# Patient Record
Sex: MALE | Race: WHITE | NOT HISPANIC OR LATINO | Employment: OTHER | URBAN - METROPOLITAN AREA
[De-identification: names, ages, dates, MRNs, and addresses within clinical notes are randomized per-mention and may not be internally consistent; named-entity substitution may affect disease eponyms.]

---

## 2017-01-05 ENCOUNTER — OFFICE VISIT (OUTPATIENT)
Dept: FAMILY MEDICINE | Facility: HOSPITAL | Age: 77
End: 2017-01-05
Attending: FAMILY MEDICINE
Payer: MEDICARE

## 2017-01-05 VITALS
DIASTOLIC BLOOD PRESSURE: 70 MMHG | BODY MASS INDEX: 18.18 KG/M2 | HEART RATE: 55 BPM | SYSTOLIC BLOOD PRESSURE: 118 MMHG | HEIGHT: 65 IN | WEIGHT: 109.13 LBS

## 2017-01-05 DIAGNOSIS — G30.9 ALZHEIMER'S DEMENTIA WITHOUT BEHAVIORAL DISTURBANCE, UNSPECIFIED TIMING OF DEMENTIA ONSET: Primary | ICD-10-CM

## 2017-01-05 DIAGNOSIS — F02.80 ALZHEIMER'S DEMENTIA WITHOUT BEHAVIORAL DISTURBANCE, UNSPECIFIED TIMING OF DEMENTIA ONSET: Primary | ICD-10-CM

## 2017-01-05 PROCEDURE — 99213 OFFICE O/P EST LOW 20 MIN: CPT | Performed by: FAMILY MEDICINE

## 2017-01-05 RX ORDER — MEMANTINE HYDROCHLORIDE 5 MG/1
5 TABLET ORAL 2 TIMES DAILY
Qty: 60 TABLET | Refills: 11 | Status: SHIPPED | OUTPATIENT
Start: 2017-01-05 | End: 2017-02-08 | Stop reason: DRUGHIGH

## 2017-01-05 NOTE — PROGRESS NOTES
Subjective:       Patient ID: Héctor Harvey is a 76 y.o. male.    Chief Complaint: Check-up    HPI Mr. Harvey is 77 yo M w/ PMHX of dementia who presents with for f/u of his Dementia, medication refills and labs. He is compliant with his Donepezil 10 mg and Namenda XR 28 mg. Patient lives with his wife and drives short distances when she is present. Can dress, bath and feed himself however he does not cook, pay bills, grocery shop alone or adminster his daily medications. Per family and patient, he sleeps well at night and denies confusion, weakness and mood changes. Mr. Anaya said he is feeling well overall and has no complaints.      Review of Systems   Constitutional: Negative for chills and fever.   Eyes: Negative for visual disturbance.   Respiratory: Negative for cough, shortness of breath and wheezing.    Cardiovascular: Negative for chest pain.   Gastrointestinal: Negative for abdominal pain, diarrhea and nausea.   Endocrine: Negative for polydipsia and polyuria.   Musculoskeletal: Negative for arthralgias.   Skin: Negative for rash.   Neurological: Negative for dizziness and headaches.   Psychiatric/Behavioral: Negative for sleep disturbance. The patient is not nervous/anxious.        Objective:      Vitals:    01/05/17 0922   BP: 118/70   Pulse: (!) 55     Physical Exam   Constitutional: He appears well-developed and well-nourished.   HENT:   Head: Normocephalic and atraumatic.   Eyes: EOM are normal.   Neck: Normal range of motion. Neck supple.   Cardiovascular: Normal rate, regular rhythm and normal heart sounds.    Pulmonary/Chest: Effort normal and breath sounds normal.   Abdominal: Soft. Bowel sounds are normal.   Musculoskeletal: Normal range of motion.   Neurological: He is alert. He exhibits normal muscle tone. Coordination normal.   Could no recall current year and name of hospital   Skin: Skin is warm and dry.   Psychiatric: His behavior is normal. Judgment and thought content normal.        Assessment:       1. Alzheimer's dementia without behavioral disturbance, unspecified timing of dementia onset        Plan:       Alzheimer's dementia without behavioral disturbance, unspecified timing of dementia onset  -     memantine (NAMENDA) 5 MG Tab; Take 1 tablet (5 mg total) by mouth 2 (two) times daily.  Dispense: 60 tablet; Refill: 11        -     Changed from Namenda to generic Memantine for insurance purposes        -     [facility] labs ordered, CMP, TSH w/ reflex free T4 and Lipid Panel    Return in about 3 months (around 4/5/2017).

## 2017-01-05 NOTE — PROGRESS NOTES
I assume primary medical responsibility for this patient, I have reviewed the case history, findings, diagnosis and treatment plan with the resident and agree that the care is reasonable and necessary. This service has been performed by a resident without the presence of a teaching physician under the primary care exception  Linda Major  1/5/2017

## 2017-02-08 ENCOUNTER — TELEPHONE (OUTPATIENT)
Dept: FAMILY MEDICINE | Facility: HOSPITAL | Age: 77
End: 2017-02-08

## 2017-02-08 DIAGNOSIS — G30.9 ALZHEIMER'S DEMENTIA WITHOUT BEHAVIORAL DISTURBANCE, UNSPECIFIED TIMING OF DEMENTIA ONSET: Primary | ICD-10-CM

## 2017-02-08 DIAGNOSIS — F02.80 ALZHEIMER'S DEMENTIA WITHOUT BEHAVIORAL DISTURBANCE, UNSPECIFIED TIMING OF DEMENTIA ONSET: Primary | ICD-10-CM

## 2017-02-08 RX ORDER — MEMANTINE HYDROCHLORIDE 10 MG/1
10 TABLET ORAL 2 TIMES DAILY
Qty: 60 TABLET | Refills: 6 | Status: SHIPPED | OUTPATIENT
Start: 2017-02-08 | End: 2017-07-26 | Stop reason: SDUPTHER

## 2017-02-08 NOTE — TELEPHONE ENCOUNTER
Called patient's daughter Ms. Quick. At last appt 1/2017, switched from Namenda 28 mg to generic Memantine 5 mg BID due to change in insurance coverage. States father's behavior has changed since switching from Namenda. Placed order for Memantine 10 mg BID. Will monitor and if patient does not do well on increased dosage, will consider contacting insurance company to request coverage of previously prescribed Namenda.

## 2017-04-25 ENCOUNTER — OFFICE VISIT (OUTPATIENT)
Dept: FAMILY MEDICINE | Facility: HOSPITAL | Age: 77
End: 2017-04-25
Attending: FAMILY MEDICINE
Payer: MEDICARE

## 2017-04-25 VITALS
SYSTOLIC BLOOD PRESSURE: 122 MMHG | HEIGHT: 66 IN | HEART RATE: 59 BPM | BODY MASS INDEX: 16.87 KG/M2 | WEIGHT: 104.94 LBS | DIASTOLIC BLOOD PRESSURE: 73 MMHG

## 2017-04-25 DIAGNOSIS — G30.9 ALZHEIMER'S DEMENTIA WITHOUT BEHAVIORAL DISTURBANCE, UNSPECIFIED TIMING OF DEMENTIA ONSET: Primary | ICD-10-CM

## 2017-04-25 DIAGNOSIS — F02.80 ALZHEIMER'S DEMENTIA WITHOUT BEHAVIORAL DISTURBANCE, UNSPECIFIED TIMING OF DEMENTIA ONSET: Primary | ICD-10-CM

## 2017-04-25 DIAGNOSIS — R17 TOTAL BILIRUBIN, ELEVATED: ICD-10-CM

## 2017-04-25 PROCEDURE — 99213 OFFICE O/P EST LOW 20 MIN: CPT | Performed by: FAMILY MEDICINE

## 2017-04-25 NOTE — MR AVS SNAPSHOT
"    [facility]  [address], [address]  [place] [address]  Phone: [phone]  Fax: [phone]                  Héctor Harvey   2017 1:20 PM   Office Visit    Description:  Male : 1940   Provider:  Corinna Roy MD   Department:  [facility]           Reason for Visit     Check-up                To Do List           Goals (5 Years of Data)     None      [facility]     [facility] Care Line -  Assistance  Unless otherwise directed by your provider, please contact [facility], our nurse care line that is available for  assistance.     Registered nurses in the [facility] provide: appointment scheduling, clinical advisement, health education, and other advisory services.  Call: [phone] (toll free)               Medications           Message regarding Medications     Verify the changes and/or additions to your medication regime listed below are the same as discussed with your clinician today.  If any of these changes or additions are incorrect, please notify your healthcare provider.             Verify that the below list of medications is an accurate representation of the medications you are currently taking.  If none reported, the list may be blank. If incorrect, please contact your healthcare provider. Carry this list with you in case of emergency.           Current Medications     cholecalciferol, vitamin D3, 1,000 unit capsule Take 1 capsule (1,000 Units total) by mouth once daily.    donepezil (ARICEPT) 10 MG tablet Take 1 tablet (10 mg total) by mouth once daily.    memantine (NAMENDA) 10 MG Tab Take 1 tablet (10 mg total) by mouth 2 (two) times daily.           Clinical Reference Information           Your Vitals Were     BP Pulse Height Weight BMI    122/73 59 5' 6" (1.676 m) 47.6 kg (104 lb 15 oz) 16.94 kg/m2      Blood Pressure          Most Recent Value    BP  122/73      Allergies as of " 4/25/2017     No Known Allergies      Immunizations Administered on Date of Encounter - 4/25/2017     None      Language Assistance Services     ATTENTION: Language assistance services are available, free of charge. Please call [phone].      ATENCIÓN: Si steff ramirez, tiene a dooley disposición servicios gratuitos de asistencia lingüística. Llame al [phone].     CHÚ Ý: N?u b?n nói Ti?ng Vi?t, có các d?ch v? h? tr? ngôn ng? mi?n phí dành cho b?n. G?i s? [phone].         [facility] complies with applicable Federal civil rights laws and does not discriminate on the basis of race, color, national origin, age, disability, or sex.

## 2017-04-25 NOTE — PROGRESS NOTES
I reviewed the note and discussed with Dr. Roy. Stable dementia. It sounds like the bilirubinemia has been present for a year at least. Getting a total and direct bili at some point is reasonable.

## 2017-04-25 NOTE — PROGRESS NOTES
Subjective:       Patient ID: Héctor Harvey is a 77 y.o. male.    Chief Complaint: Check-up    HPI Mr. Harvey is 77 yo M w/ PMHX of dementia who presents for check-up and lab results. He is compliant with his Donepezil 10 mg and Memantine 10 mg with no complaints (was previously taking Namenda XR 28 mg). Patient lives with his wife and drives short distances when she is present. Per family, patient has no changes from baseline. States he is feeling well overall and has no complaints.      Review of Systems   Constitutional: Negative for chills and fever.   HENT: Negative for congestion, hearing loss and sore throat.    Eyes: Negative for visual disturbance.   Respiratory: Negative for cough, shortness of breath and wheezing.    Cardiovascular: Negative for chest pain and leg swelling.   Gastrointestinal: Negative for abdominal pain, diarrhea and nausea.   Endocrine: Negative for polydipsia and polyuria.   Musculoskeletal: Negative for arthralgias and gait problem.   Skin: Negative for rash.   Neurological: Negative for weakness and numbness.   Psychiatric/Behavioral: Negative for sleep disturbance. The patient is not nervous/anxious.        Objective:      Vitals:    04/25/17 1323   BP: 122/73   Pulse: (!) 59     Physical Exam   Constitutional: He appears well-developed and well-nourished.   HENT:   Head: Normocephalic and atraumatic.   Mouth/Throat: Oropharynx is clear and moist.   Bilateral excess cerumen   Eyes: EOM are normal. No scleral icterus.   Neck: Normal range of motion. Neck supple.   Cardiovascular: Normal rate, regular rhythm and normal heart sounds.    Pulmonary/Chest: Effort normal and breath sounds normal.   Abdominal: Soft. Bowel sounds are normal.   Musculoskeletal: Normal range of motion.   Neurological: He is alert. He exhibits normal muscle tone. Coordination normal.   Skin: Skin is warm and dry.   Psychiatric: His behavior is normal. Judgment and thought content normal.       Assessment:        1. Alzheimer's dementia without behavioral disturbance, unspecified timing of dementia onset    2. Total bilirubin, elevated        Plan:       Alzheimer's dementia without behavioral disturbance, unspecified timing of dementia onset  -Currently takes Donepezil 10 mg and Memantine 10 mg   -Wife and daughter deny changes from baseline    Total Bilirubin, Elevated  -Last T. Bili 2.2 (2/2017). Per chart check 2.5 (3/2016)  -Patient denies abdominal pain, N/V, changes in bowel habits  -Will continue to monitor      Return in about 3 months (around 7/25/2017).      Corinna Roy MD  4/25/2017  [facility]

## 2017-07-26 ENCOUNTER — OFFICE VISIT (OUTPATIENT)
Dept: FAMILY MEDICINE | Facility: HOSPITAL | Age: 77
End: 2017-07-26
Attending: FAMILY MEDICINE
Payer: MEDICARE

## 2017-07-26 VITALS
HEART RATE: 58 BPM | SYSTOLIC BLOOD PRESSURE: 108 MMHG | WEIGHT: 102.5 LBS | DIASTOLIC BLOOD PRESSURE: 69 MMHG | HEIGHT: 66 IN | BODY MASS INDEX: 16.47 KG/M2

## 2017-07-26 DIAGNOSIS — F02.80 ALZHEIMER'S DEMENTIA WITHOUT BEHAVIORAL DISTURBANCE, UNSPECIFIED TIMING OF DEMENTIA ONSET: Primary | ICD-10-CM

## 2017-07-26 DIAGNOSIS — G30.9 ALZHEIMER'S DEMENTIA WITHOUT BEHAVIORAL DISTURBANCE, UNSPECIFIED TIMING OF DEMENTIA ONSET: Primary | ICD-10-CM

## 2017-07-26 DIAGNOSIS — R17 TOTAL BILIRUBIN, ELEVATED: ICD-10-CM

## 2017-07-26 PROCEDURE — 99213 OFFICE O/P EST LOW 20 MIN: CPT | Performed by: FAMILY MEDICINE

## 2017-07-26 RX ORDER — MEMANTINE HYDROCHLORIDE 10 MG/1
10 TABLET ORAL 2 TIMES DAILY
Qty: 180 TABLET | Refills: 6 | Status: SHIPPED | OUTPATIENT
Start: 2017-07-26 | End: 2018-08-16 | Stop reason: SDUPTHER

## 2017-07-26 NOTE — PROGRESS NOTES
Subjective:       Patient ID: Héctor Harvey is a 77 y.o. male.    Chief Complaint: F/U and medication refill    HPI  Mr. Harvey is a 77 yo M w/ PMHX of Alzheimer's dementia who presents for check-up and medication refill. Currently takes Donepezil 10 mg and Memantine 10 mg with no complaints. Patient lives with his wife and drives short distances (i.e. [employer] and grocery store) when she is present. Per family, patient has had no recent changes from baseline. Patient states he is feeling well overall and has no complaints. Per chart check patient, patient does have elevated total bili at baseline. Most recent total bili 2.2 (2/2107), 2.5 (3/2016). Per chart check, previous Gilbert Syndrome diagnosis.    Review of Systems   Eyes: Negative for visual disturbance.   Respiratory: Negative for cough and shortness of breath.    Cardiovascular: Negative for chest pain and leg swelling.   Gastrointestinal: Negative for abdominal pain, nausea and vomiting.   Endocrine: Negative for polydipsia and polyuria.   Musculoskeletal: Negative for arthralgias and gait problem.   Skin: Negative for rash.   Neurological: Negative for dizziness, weakness and numbness.   Psychiatric/Behavioral: Negative for sleep disturbance. The patient is not nervous/anxious.        Objective:      Vitals:    07/26/17 0843   BP: 108/69   Pulse: (!) 58     Physical Exam   Constitutional: He appears well-developed and well-nourished.   HENT:   Head: Normocephalic and atraumatic.   Mouth/Throat: Oropharynx is clear and moist.   Eyes: No scleral icterus.   Cardiovascular: Normal rate, regular rhythm and normal heart sounds.    Pulmonary/Chest: Effort normal and breath sounds normal.   Abdominal: Soft. Bowel sounds are normal.   Musculoskeletal: Normal range of motion.   Neurological: He is alert. He exhibits normal muscle tone. Coordination normal.   Aware of person and situation   Skin: Skin is warm and dry.   Psychiatric: His behavior is normal. Judgment  and thought content normal.       Assessment:       1. Alzheimer's dementia without behavioral disturbance, unspecified timing of dementia onset    2. Total bilirubin, elevated        Plan:       Alzheimer's dementia without behavioral disturbance, unspecified timing of dementia onset  -Currently takes Donepezil 10 mg and Memantine 10 mg   -Doing well with current meds. Wife and daughter deny changes from baseline  -memantine (NAMENDA) 10 MG Tab; Take 1 tablet (10 mg total) by mouth 2 (two) times daily.    Total bilirubin, elevated  -Last T. Bili 2.2 (2/2017). Per chart check 2.5 (3/2016)  -Comprehensive metabolic panel; Future  -Per chart check, previous Gilbert's Syndrome diagnosis   -Will continue to monitor      Return in about 3 months (around 10/26/2017).      Corinna Roy MD  7/26/2017  [facility] HO-2

## 2017-07-27 ENCOUNTER — TELEPHONE (OUTPATIENT)
Dept: FAMILY MEDICINE | Facility: HOSPITAL | Age: 77
End: 2017-07-27

## 2017-08-02 RX ORDER — DONEPEZIL HYDROCHLORIDE 10 MG/1
10 TABLET, FILM COATED ORAL DAILY
Qty: 90 TABLET | Refills: 3 | Status: SHIPPED | OUTPATIENT
Start: 2017-08-02 | End: 2018-07-24 | Stop reason: SDUPTHER

## 2017-08-18 ENCOUNTER — TELEPHONE (OUTPATIENT)
Dept: HEPATOLOGY | Facility: HOSPITAL | Age: 77
End: 2017-08-18

## 2017-08-18 NOTE — TELEPHONE ENCOUNTER
Patient's daughter (Ynes Harvey) contacted clinic today regarding patient (her dad) having multiple recent falls. Returned daughter's call and advised her to take her dad to ED for evaluation if she was concerned about his mentation and physical condition.

## 2017-11-13 ENCOUNTER — OFFICE VISIT (OUTPATIENT)
Dept: FAMILY MEDICINE | Facility: HOSPITAL | Age: 77
End: 2017-11-13
Attending: FAMILY MEDICINE
Payer: MEDICARE

## 2017-11-13 VITALS
DIASTOLIC BLOOD PRESSURE: 75 MMHG | SYSTOLIC BLOOD PRESSURE: 113 MMHG | HEIGHT: 66 IN | HEART RATE: 59 BPM | BODY MASS INDEX: 17.29 KG/M2 | WEIGHT: 107.56 LBS

## 2017-11-13 DIAGNOSIS — Z23 NEED FOR PROPHYLACTIC VACCINATION AND INOCULATION AGAINST INFLUENZA: ICD-10-CM

## 2017-11-13 DIAGNOSIS — F02.80 ALZHEIMER'S DEMENTIA WITHOUT BEHAVIORAL DISTURBANCE, UNSPECIFIED TIMING OF DEMENTIA ONSET: ICD-10-CM

## 2017-11-13 DIAGNOSIS — G30.9 ALZHEIMER'S DEMENTIA WITHOUT BEHAVIORAL DISTURBANCE, UNSPECIFIED TIMING OF DEMENTIA ONSET: ICD-10-CM

## 2017-11-13 DIAGNOSIS — E80.4 GILBERT'S DISEASE: Primary | ICD-10-CM

## 2017-11-13 PROCEDURE — 99213 OFFICE O/P EST LOW 20 MIN: CPT | Mod: 25 | Performed by: FAMILY MEDICINE

## 2017-11-13 PROCEDURE — G0008 ADMIN INFLUENZA VIRUS VAC: HCPCS

## 2017-11-13 NOTE — PROGRESS NOTES
"Subjective:       Patient ID: Héctor Harvey is a 77 y.o. male.    Chief Complaint: Follow-up and Flu Vaccine    HPI Pt is 78 yo M w/ PMHX of Alzheimer's dementia who presents for check-up. He currently takes Donepezil 10 mg and Memantine 10 mg with no issues. Per patient's daughter, patient has had two recent falls in the last month while walking his dog outside. Per wife, he "tripped" on the sidewalk. There was no head trauma or LOC with either fall. Patient lives with his wife and previously drove short distances (i.e. [employer] and grocery store) when wife was present, however due to recent falls patient no longer drives. No other recent changes from baseline per patient and family. He reports good hydration and denies visual changes, headaches, SOB.     Review of Systems   Constitutional: Negative for activity change and appetite change.   Eyes: Negative for visual disturbance.   Respiratory: Negative for cough and shortness of breath.    Cardiovascular: Negative for chest pain.   Gastrointestinal: Negative for abdominal pain, nausea and vomiting.   Musculoskeletal: Negative for arthralgias.   Skin: Negative for rash.   Neurological: Negative for dizziness, weakness, numbness and headaches.   Psychiatric/Behavioral: Negative for sleep disturbance. The patient is not nervous/anxious.        Objective:      Vitals:    11/13/17 0834   BP: 113/75. 120/70 sitting, 116/70 standing   Pulse: (!) 59     Physical Exam   Constitutional: He appears well-developed and well-nourished.   HENT:   Head: Normocephalic and atraumatic.   Mouth/Throat: Oropharynx is clear and moist.   Eyes: No scleral icterus.   Cardiovascular: Normal rate, regular rhythm and normal heart sounds.    Pulmonary/Chest: Effort normal and breath sounds normal.   Abdominal: Soft. Bowel sounds are normal.   Musculoskeletal: Normal range of motion.   5/5 strength upper and lower extremities. ROM intact. Stable gait   Neurological: He is alert. He exhibits " normal muscle tone. Coordination normal.   Aware of person,  and situation   Skin: Skin is warm and dry.   Psychiatric: His behavior is normal. Judgment and thought content normal.       Assessment:       1. Gilbert's disease    2. Alzheimer's dementia without behavioral disturbance, unspecified timing of dementia onset    3. Need for prophylactic vaccination and inoculation against influenza        Plan:       Gilbert's disease       -    Most recent T. Bili 2.1 improved from 2.2 (2017)    Alzheimer's dementia without behavioral disturbance, unspecified timing of dementia onset  -     Ambulatory Referral to Physical/Occupational Therapy  -     Pt with recent falls. PT/OT will assess  -     Negative orthostatics in clinic    Need for prophylactic vaccination and inoculation against influenza  -     Flu Vaccine - High Dose (PF) (65+)      Return in about 3 months (around 2018).   Corinna Roy MD  2017  [facility]          Flu consent signed by patient. Flu vaccine administered.

## 2017-11-17 DIAGNOSIS — G30.9 ALZHEIMER'S DEMENTIA WITHOUT BEHAVIORAL DISTURBANCE, UNSPECIFIED TIMING OF DEMENTIA ONSET: ICD-10-CM

## 2017-11-17 DIAGNOSIS — F02.80 ALZHEIMER'S DEMENTIA WITHOUT BEHAVIORAL DISTURBANCE, UNSPECIFIED TIMING OF DEMENTIA ONSET: ICD-10-CM

## 2017-11-17 DIAGNOSIS — R26.89 LOSS OF BALANCE: Primary | ICD-10-CM

## 2018-03-02 ENCOUNTER — OFFICE VISIT (OUTPATIENT)
Dept: FAMILY MEDICINE | Facility: HOSPITAL | Age: 78
End: 2018-03-02
Attending: FAMILY MEDICINE
Payer: MEDICARE

## 2018-03-02 VITALS
BODY MASS INDEX: 17.12 KG/M2 | HEART RATE: 63 BPM | SYSTOLIC BLOOD PRESSURE: 100 MMHG | DIASTOLIC BLOOD PRESSURE: 64 MMHG | WEIGHT: 106.5 LBS | HEIGHT: 66 IN

## 2018-03-02 DIAGNOSIS — F02.80 OTHER FRONTOTEMPORAL DEMENTIA WITHOUT BEHAVIORAL DISTURBANCE: Primary | ICD-10-CM

## 2018-03-02 DIAGNOSIS — G30.8 ALZHEIMER'S DISEASE OF OTHER ONSET WITHOUT BEHAVIORAL DISTURBANCE: ICD-10-CM

## 2018-03-02 DIAGNOSIS — E80.4 GILBERT'S DISEASE: ICD-10-CM

## 2018-03-02 DIAGNOSIS — F02.80 ALZHEIMER'S DISEASE OF OTHER ONSET WITHOUT BEHAVIORAL DISTURBANCE: ICD-10-CM

## 2018-03-02 DIAGNOSIS — G31.09 OTHER FRONTOTEMPORAL DEMENTIA WITHOUT BEHAVIORAL DISTURBANCE: Primary | ICD-10-CM

## 2018-03-02 PROCEDURE — 99213 OFFICE O/P EST LOW 20 MIN: CPT | Performed by: FAMILY MEDICINE

## 2018-03-02 NOTE — PROGRESS NOTES
Subjective:       Patient ID: Héctor Harvey is a 78 y.o. male.    Chief Complaint: Follow-up    HPI Pt is 79 yo M w/ PMHX of Alzheimer's dementia, Gilbert's Disease who presents for follow-up. He currently takes Donepezil 10 mg and Memantine 10 mg with no issues. Patient lives with his wife and previously drove short distances (i.e. [employer] and grocery store) when wife was present, however patient's daughter states that they no longer allow him to drive due to decreased reaction time. No other recent changes from baseline per patient and family. Patient can feed, dress and bathe himself with no issues. He has had no recent falls.    Per patient's daughter, during initial Alzheimer's Disease diagnosis years ago, patient had complete dementia work-up to rule out other causes of dementia including CT head.    Review of Systems   Constitutional: Negative for activity change and appetite change.   Eyes: Negative for visual disturbance.   Respiratory: Negative for cough and shortness of breath.    Cardiovascular: Negative for chest pain.   Gastrointestinal: Negative for abdominal pain, nausea and vomiting.   Musculoskeletal: Negative for arthralgias.   Skin: Negative for rash.   Neurological: Negative for dizziness, weakness, numbness and headaches.   Psychiatric/Behavioral: Negative for sleep disturbance. The patient is not nervous/anxious.        Objective:      Vitals:    03/02/18 0853   BP: 100/64   Pulse: 63     Physical Exam   Constitutional: He appears well-developed and well-nourished.   Pleasant, cooperative   HENT:   Head: Normocephalic and atraumatic.   Mouth/Throat: Oropharynx is clear and moist.   Eyes: No scleral icterus.   Cardiovascular: Normal rate, regular rhythm and normal heart sounds.    Pulmonary/Chest: Effort normal and breath sounds normal.   Abdominal: Soft. Bowel sounds are normal.   Musculoskeletal: Normal range of motion.   5/5 strength upper and lower extremities. ROM intact. Stable gait    Neurological: He is alert. He exhibits normal muscle tone. Coordination normal.   Alert. Oriented to person and situation   Skin: Skin is warm and dry.   Psychiatric: His behavior is normal. Judgment and thought content normal.       Assessment:       1. Other frontotemporal dementia without behavioral disturbance    2. Alzheimer's disease of other onset without behavioral disturbance    3. Gilbert's disease        Plan:       Other frontotemporal dementia without behavioral disturbance  -     Magnesium; Future; Expected date: 03/02/2018  -     VITAMIN B-12 BINDING CAPACITY; Future; Expected date: 03/02/2018  -     TSH; Future; Expected date: 03/02/2018        -      Per patient's daughter, during initial Alzheimer's Disease diagnosis years ago, patient had complete dementia work-up to rule out other causes of dementia including CT head.    Alzheimer's disease of other onset without behavioral disturbance         -     Continue Donepezil and Namenda    Gilbert's disease  -     Comprehensive metabolic panel; Future; Expected date: 03/02/2018  -     Most recent T. Bili 2.1 7/2017      Follow-up in about 6 months (around 9/2/2018).     Corinna Roy MD  3/2/2018  [facility] HO-2

## 2018-07-24 ENCOUNTER — OFFICE VISIT (OUTPATIENT)
Dept: FAMILY MEDICINE | Facility: HOSPITAL | Age: 78
End: 2018-07-24
Attending: FAMILY MEDICINE
Payer: MEDICARE

## 2018-07-24 ENCOUNTER — TELEPHONE (OUTPATIENT)
Dept: FAMILY MEDICINE | Facility: HOSPITAL | Age: 78
End: 2018-07-24

## 2018-07-24 VITALS
HEIGHT: 65 IN | SYSTOLIC BLOOD PRESSURE: 108 MMHG | HEART RATE: 65 BPM | DIASTOLIC BLOOD PRESSURE: 73 MMHG | WEIGHT: 104.06 LBS | BODY MASS INDEX: 17.34 KG/M2

## 2018-07-24 DIAGNOSIS — G30.8 ALZHEIMER'S DISEASE OF OTHER ONSET WITHOUT BEHAVIORAL DISTURBANCE: Primary | ICD-10-CM

## 2018-07-24 DIAGNOSIS — E80.4 GILBERT'S DISEASE: ICD-10-CM

## 2018-07-24 DIAGNOSIS — F02.80 ALZHEIMER'S DISEASE OF OTHER ONSET WITHOUT BEHAVIORAL DISTURBANCE: Primary | ICD-10-CM

## 2018-07-24 PROCEDURE — 99213 OFFICE O/P EST LOW 20 MIN: CPT | Performed by: FAMILY MEDICINE

## 2018-07-24 RX ORDER — DONEPEZIL HYDROCHLORIDE 10 MG/1
10 TABLET, FILM COATED ORAL DAILY
Qty: 90 TABLET | Refills: 3 | Status: SHIPPED | OUTPATIENT
Start: 2018-07-24 | End: 2019-05-24 | Stop reason: SDUPTHER

## 2018-07-24 NOTE — PROGRESS NOTES
I assume primary medical responsibility for this patient, I have reviewed the case history, findings, diagnosis and treatment plan with the resident and agree that the care is reasonable and necessary. This service has been performed by a resident without the presence of a teaching physician under the primary care exception  Linda Major  7/24/2018

## 2018-07-24 NOTE — PROGRESS NOTES
Subjective:       Patient ID: Héctor Harvey is a 78 y.o. male.    Chief Complaint: Check-up    HPI Pt is 77 yo M w/ PMHx of Alzheimer's dementia, Gilbert's Disease who presents for follow-up. He currently takes Donepezil 10 mg and Memantine 10 mg with no issues. Lives with his wife and no longer drives. Relatives took car keys due to decreased reaction time. No other recent changes from baseline per patient's daughter. Patient not oriented to time and place. Has had no recent falls. Per patient's daughter, she stimulates him often with music and redirects him as needed.      Review of Systems   Constitutional: Negative for activity change and appetite change.   Eyes: Negative for visual disturbance.   Respiratory: Negative for cough and shortness of breath.    Cardiovascular: Negative for chest pain.   Gastrointestinal: Negative for abdominal pain, nausea and vomiting.   Musculoskeletal: Negative for arthralgias.   Skin: Negative for rash.   Neurological: Negative for dizziness, weakness, numbness and headaches.   Psychiatric/Behavioral: Negative for sleep disturbance. The patient is not nervous/anxious.        Objective:      Vitals:    07/24/18 0839   BP: 108/73   Pulse: 65     Physical Exam   Constitutional: He appears well-developed and well-nourished.   Pleasant, cooperative   HENT:   Head: Normocephalic and atraumatic.   Mouth/Throat: Oropharynx is clear and moist.   Eyes: No scleral icterus.   Cardiovascular: Normal rate, regular rhythm and normal heart sounds.    Pulmonary/Chest: Effort normal and breath sounds normal.   Abdominal: Soft. Bowel sounds are normal.   Musculoskeletal: Normal range of motion.   5/5 strength upper and lower extremities. ROM intact.    Neurological: He is alert. He exhibits normal muscle tone. Coordination normal.   Follows directions well   Skin: Skin is warm and dry.   Psychiatric: His behavior is normal. Judgment and thought content normal.       Assessment:       1.  Alzheimer's disease of other onset without behavioral disturbance    2. Gilbert's disease        Plan:       Alzheimer's disease of other onset without behavioral disturbance  -     donepezil (ARICEPT) 10 MG tablet; Take 1 tablet (10 mg total) by mouth once daily.  Dispense: 90 tablet; Refill: 3    Gilbert's disease       -    Most recent T. Bili 1.9. Continues to trend down. Will repeat CMP at next visit.      Follow-up in about 3 months (around 10/24/2018).     Corinna Roy MD  7/24/2018  [facility]

## 2018-08-16 DIAGNOSIS — G30.9 ALZHEIMER'S DEMENTIA WITHOUT BEHAVIORAL DISTURBANCE, UNSPECIFIED TIMING OF DEMENTIA ONSET: ICD-10-CM

## 2018-08-16 DIAGNOSIS — F02.80 ALZHEIMER'S DEMENTIA WITHOUT BEHAVIORAL DISTURBANCE, UNSPECIFIED TIMING OF DEMENTIA ONSET: ICD-10-CM

## 2018-08-16 RX ORDER — MEMANTINE HYDROCHLORIDE 10 MG/1
10 TABLET ORAL 2 TIMES DAILY
Qty: 180 TABLET | Refills: 6 | Status: SHIPPED | OUTPATIENT
Start: 2018-08-16 | End: 2019-11-11 | Stop reason: SDUPTHER

## 2018-08-16 NOTE — TELEPHONE ENCOUNTER
----- Message from Cristine Dangelo sent at 2018 11:10 AM CDT -----  PHARMACY CALL NEED REFILL ON memantine (NAMENDA) 10 MG Tab () AS SOON AS POSSIBLE PLEASE PT IS OUT

## 2018-11-16 ENCOUNTER — OFFICE VISIT (OUTPATIENT)
Dept: FAMILY MEDICINE | Facility: HOSPITAL | Age: 78
End: 2018-11-16
Attending: FAMILY MEDICINE
Payer: MEDICARE

## 2018-11-16 VITALS
SYSTOLIC BLOOD PRESSURE: 113 MMHG | BODY MASS INDEX: 17.22 KG/M2 | WEIGHT: 103.38 LBS | DIASTOLIC BLOOD PRESSURE: 72 MMHG | HEIGHT: 65 IN | HEART RATE: 64 BPM

## 2018-11-16 DIAGNOSIS — Z23 INFLUENZA VACCINE NEEDED: ICD-10-CM

## 2018-11-16 DIAGNOSIS — F02.80 ALZHEIMER'S DEMENTIA WITHOUT BEHAVIORAL DISTURBANCE, UNSPECIFIED TIMING OF DEMENTIA ONSET: Primary | ICD-10-CM

## 2018-11-16 DIAGNOSIS — G30.9 ALZHEIMER'S DEMENTIA WITHOUT BEHAVIORAL DISTURBANCE, UNSPECIFIED TIMING OF DEMENTIA ONSET: Primary | ICD-10-CM

## 2018-11-16 DIAGNOSIS — E80.4 GILBERT'S DISEASE: ICD-10-CM

## 2018-11-16 PROCEDURE — 99213 OFFICE O/P EST LOW 20 MIN: CPT | Mod: 25 | Performed by: FAMILY MEDICINE

## 2018-11-16 PROCEDURE — 90662 IIV NO PRSV INCREASED AG IM: CPT

## 2018-11-16 NOTE — PROGRESS NOTES
Subjective:       Patient ID: Héctor Harvey is a 78 y.o. male.    Chief Complaint: Alzheimer's F/U    HPI Pt is 79 yo M w/ PMHx of Alzheimer's dementia, Gilbert's Disease who presents for follow-up of Alzheimer's. Currently takes Donepezil 10 mg and Memantine 10 mg with no issues. Accompanied to visit by her wife and daughter. He no longer drives. Mentation is at baseline. Has had no recent falls. No acute complaints today.      Review of Systems   Constitutional: Negative for activity change and appetite change.   Eyes: Negative for visual disturbance.   Respiratory: Negative for cough and shortness of breath.    Cardiovascular: Negative for chest pain.   Gastrointestinal: Negative for abdominal pain, nausea and vomiting.   Musculoskeletal: Negative for arthralgias.   Skin: Negative for rash.   Neurological: Negative for dizziness, weakness, numbness and headaches.   Psychiatric/Behavioral: Negative for sleep disturbance. The patient is not nervous/anxious.        Objective:      Vitals:    11/16/18 0843   BP: 113/72   Pulse: 64     Physical Exam   Constitutional: He appears well-developed and well-nourished.   HENT:   Head: Normocephalic and atraumatic.   Mouth/Throat: Oropharynx is clear and moist.   Eyes: No scleral icterus.   Cardiovascular: Normal rate, regular rhythm and normal heart sounds.   Pulmonary/Chest: Effort normal and breath sounds normal.   Abdominal: Soft. Bowel sounds are normal.   Musculoskeletal: Normal range of motion.       Neurological: He is alert. He exhibits normal muscle tone. Coordination normal.   Pleasant, cooperative   Skin: Skin is warm and dry.   Psychiatric: His behavior is normal. Judgment and thought content normal.       Assessment:       1. Alzheimer's dementia without behavioral disturbance, unspecified timing of dementia onset    2. Gilbert's disease    3. Influenza vaccine needed        Plan:       Alzheimer's dementia without behavioral disturbance, unspecified timing of  dementia onset  -     CBC auto differential; Future; Expected date: 11/16/2018  -     Continue Donepezil and Memantine    Gilbert's disease        -     Last T. Bili 1.9 (3/2018)  -     Comprehensive metabolic panel; Future; Expected date: 11/16/2018    Influenza vaccine needed  -     Influenza - High Dose (65+) (PF) (IM)      Follow-up in about 6 months (around 5/16/2019).        Corinna Roy MD  12/17/2018  [facility]

## 2018-11-20 ENCOUNTER — TELEPHONE (OUTPATIENT)
Dept: FAMILY MEDICINE | Facility: HOSPITAL | Age: 78
End: 2018-11-20

## 2018-11-20 NOTE — TELEPHONE ENCOUNTER
Called and discussed labs with patient's daughter. Pt with history of Gilbert's Syndrome. SHANNAN Guerrier trended up some. Will monitor and repeat cmp at next visit.

## 2018-12-20 NOTE — PROGRESS NOTES
I have reviewed the notes, assessments, and/or procedures performed, I concur with her/his documentation of Héctor Harvey.

## 2019-05-24 ENCOUNTER — HOSPITAL ENCOUNTER (OUTPATIENT)
Facility: HOSPITAL | Age: 79
Discharge: HOME | End: 2019-05-26
Attending: EMERGENCY MEDICINE | Admitting: INTERNAL MEDICINE
Payer: MEDICARE

## 2019-05-24 DIAGNOSIS — E80.4 GILBERT'S DISEASE: ICD-10-CM

## 2019-05-24 DIAGNOSIS — F02.80 LATE ONSET ALZHEIMER'S DISEASE WITHOUT BEHAVIORAL DISTURBANCE: ICD-10-CM

## 2019-05-24 DIAGNOSIS — E86.9 VOLUME DEPLETION: ICD-10-CM

## 2019-05-24 DIAGNOSIS — N30.00 ACUTE CYSTITIS WITHOUT HEMATURIA: ICD-10-CM

## 2019-05-24 DIAGNOSIS — G30.1 LATE ONSET ALZHEIMER'S DISEASE WITHOUT BEHAVIORAL DISTURBANCE: ICD-10-CM

## 2019-05-24 DIAGNOSIS — R53.81 PHYSICAL DECONDITIONING: ICD-10-CM

## 2019-05-24 DIAGNOSIS — N39.0 URINARY TRACT INFECTION WITHOUT HEMATURIA, SITE UNSPECIFIED: Primary | ICD-10-CM

## 2019-05-24 DIAGNOSIS — G93.40 ACUTE ENCEPHALOPATHY: ICD-10-CM

## 2019-05-24 DIAGNOSIS — F02.80 ALZHEIMER'S DISEASE OF OTHER ONSET WITHOUT BEHAVIORAL DISTURBANCE: ICD-10-CM

## 2019-05-24 DIAGNOSIS — E55.9 VITAMIN D DEFICIENCY: ICD-10-CM

## 2019-05-24 DIAGNOSIS — G30.8 ALZHEIMER'S DISEASE OF OTHER ONSET WITHOUT BEHAVIORAL DISTURBANCE: ICD-10-CM

## 2019-05-24 DIAGNOSIS — G93.89 CEREBRAL VENTRICULOMEGALY: ICD-10-CM

## 2019-05-24 DIAGNOSIS — F01.50 VASCULAR DEMENTIA WITHOUT BEHAVIORAL DISTURBANCE: ICD-10-CM

## 2019-05-24 DIAGNOSIS — R41.82 ALTERED MENTAL STATUS, UNSPECIFIED ALTERED MENTAL STATUS TYPE: ICD-10-CM

## 2019-05-24 DIAGNOSIS — R53.83 FATIGUE: ICD-10-CM

## 2019-05-24 LAB
ALBUMIN SERPL BCP-MCNC: 3.8 G/DL (ref 3.5–5.2)
ALP SERPL-CCNC: 149 U/L (ref 55–135)
ALT SERPL W/O P-5'-P-CCNC: 5 U/L (ref 10–44)
AMMONIA PLAS-SCNC: 29 UMOL/L (ref 10–50)
ANION GAP SERPL CALC-SCNC: 9 MMOL/L (ref 8–16)
AST SERPL-CCNC: 14 U/L (ref 10–40)
BACTERIA #/AREA URNS HPF: ABNORMAL /HPF
BASOPHILS # BLD AUTO: 0.03 K/UL (ref 0–0.2)
BASOPHILS NFR BLD: 0.3 % (ref 0–1.9)
BILIRUB DIRECT SERPL-MCNC: 0.8 MG/DL (ref 0.1–0.3)
BILIRUB SERPL-MCNC: 2.1 MG/DL (ref 0.1–1)
BILIRUB UR QL STRIP: NEGATIVE
BUN SERPL-MCNC: 15 MG/DL (ref 8–23)
CALCIUM SERPL-MCNC: 9.4 MG/DL (ref 8.7–10.5)
CHLORIDE SERPL-SCNC: 107 MMOL/L (ref 95–110)
CHOLEST SERPL-MCNC: 143 MG/DL (ref 120–199)
CHOLEST/HDLC SERPL: 3.3 {RATIO} (ref 2–5)
CK SERPL-CCNC: 43 U/L (ref 20–200)
CLARITY UR: ABNORMAL
CO2 SERPL-SCNC: 28 MMOL/L (ref 23–29)
COLOR UR: ABNORMAL
CREAT SERPL-MCNC: 0.8 MG/DL (ref 0.5–1.4)
DIFFERENTIAL METHOD: ABNORMAL
EOSINOPHIL # BLD AUTO: 0.1 K/UL (ref 0–0.5)
EOSINOPHIL NFR BLD: 0.8 % (ref 0–8)
ERYTHROCYTE [DISTWIDTH] IN BLOOD BY AUTOMATED COUNT: 13.7 % (ref 11.5–14.5)
EST. GFR  (AFRICAN AMERICAN): >60 ML/MIN/1.73 M^2
EST. GFR  (NON AFRICAN AMERICAN): >60 ML/MIN/1.73 M^2
ESTIMATED AVG GLUCOSE: 103 MG/DL (ref 68–131)
FERRITIN SERPL-MCNC: 336 NG/ML (ref 20–300)
GLUCOSE SERPL-MCNC: 87 MG/DL (ref 70–110)
GLUCOSE SERPL-MCNC: 96 MG/DL (ref 70–110)
GLUCOSE UR QL STRIP: NEGATIVE
HBA1C MFR BLD HPLC: 5.2 % (ref 4–5.6)
HCT VFR BLD AUTO: 44.4 % (ref 40–54)
HDLC SERPL-MCNC: 43 MG/DL (ref 40–75)
HDLC SERPL: 30.1 % (ref 20–50)
HGB BLD-MCNC: 14.9 G/DL (ref 14–18)
HGB UR QL STRIP: NEGATIVE
KETONES UR QL STRIP: ABNORMAL
LACTATE SERPL-SCNC: 1 MMOL/L (ref 0.5–2.2)
LDLC SERPL CALC-MCNC: 88.2 MG/DL (ref 63–159)
LEUKOCYTE ESTERASE UR QL STRIP: ABNORMAL
LYMPHOCYTES # BLD AUTO: 1.6 K/UL (ref 1–4.8)
LYMPHOCYTES NFR BLD: 15.4 % (ref 18–48)
MCH RBC QN AUTO: 31.5 PG (ref 27–31)
MCHC RBC AUTO-ENTMCNC: 33.6 G/DL (ref 32–36)
MCV RBC AUTO: 94 FL (ref 82–98)
MICROSCOPIC COMMENT: ABNORMAL
MONOCYTES # BLD AUTO: 0.8 K/UL (ref 0.3–1)
MONOCYTES NFR BLD: 8.3 % (ref 4–15)
NEUTROPHILS # BLD AUTO: 7.6 K/UL (ref 1.8–7.7)
NEUTROPHILS NFR BLD: 75.2 % (ref 38–73)
NITRITE UR QL STRIP: POSITIVE
NONHDLC SERPL-MCNC: 100 MG/DL
PH UR STRIP: 6 [PH] (ref 5–8)
PLATELET # BLD AUTO: 206 K/UL (ref 150–350)
PMV BLD AUTO: 11.4 FL (ref 9.2–12.9)
POCT GLUCOSE: 115 MG/DL (ref 70–110)
POCT GLUCOSE: 87 MG/DL (ref 70–110)
POTASSIUM SERPL-SCNC: 4.4 MMOL/L (ref 3.5–5.1)
PROCALCITONIN SERPL IA-MCNC: 0.07 NG/ML
PROT SERPL-MCNC: 6.3 G/DL (ref 6–8.4)
PROT UR QL STRIP: NEGATIVE
RBC # BLD AUTO: 4.73 M/UL (ref 4.6–6.2)
SODIUM SERPL-SCNC: 144 MMOL/L (ref 136–145)
SP GR UR STRIP: >=1.03 (ref 1–1.03)
TRIGL SERPL-MCNC: 59 MG/DL (ref 30–150)
TROPONIN I SERPL DL<=0.01 NG/ML-MCNC: <0.006 NG/ML (ref 0–0.03)
TSH SERPL DL<=0.005 MIU/L-ACNC: 2 UIU/ML (ref 0.4–4)
TSH SERPL DL<=0.005 MIU/L-ACNC: 2.56 UIU/ML (ref 0.4–4)
URN SPEC COLLECT METH UR: ABNORMAL
UROBILINOGEN UR STRIP-ACNC: ABNORMAL EU/DL
WBC # BLD AUTO: 10.05 K/UL (ref 3.9–12.7)
WBC #/AREA URNS HPF: 15 /HPF (ref 0–5)

## 2019-05-24 PROCEDURE — 84443 ASSAY THYROID STIM HORMONE: CPT | Mod: 91

## 2019-05-24 PROCEDURE — 82728 ASSAY OF FERRITIN: CPT

## 2019-05-24 PROCEDURE — 82248 BILIRUBIN DIRECT: CPT

## 2019-05-24 PROCEDURE — 87077 CULTURE AEROBIC IDENTIFY: CPT

## 2019-05-24 PROCEDURE — G0378 HOSPITAL OBSERVATION PER HR: HCPCS

## 2019-05-24 PROCEDURE — 99285 EMERGENCY DEPT VISIT HI MDM: CPT | Mod: 25

## 2019-05-24 PROCEDURE — 93005 ELECTROCARDIOGRAM TRACING: CPT

## 2019-05-24 PROCEDURE — 82746 ASSAY OF FOLIC ACID SERUM: CPT

## 2019-05-24 PROCEDURE — 25000003 PHARM REV CODE 250: Performed by: STUDENT IN AN ORGANIZED HEALTH CARE EDUCATION/TRAINING PROGRAM

## 2019-05-24 PROCEDURE — 82140 ASSAY OF AMMONIA: CPT

## 2019-05-24 PROCEDURE — 84484 ASSAY OF TROPONIN QUANT: CPT

## 2019-05-24 PROCEDURE — 63600175 PHARM REV CODE 636 W HCPCS: Performed by: EMERGENCY MEDICINE

## 2019-05-24 PROCEDURE — 63600175 PHARM REV CODE 636 W HCPCS: Performed by: STUDENT IN AN ORGANIZED HEALTH CARE EDUCATION/TRAINING PROGRAM

## 2019-05-24 PROCEDURE — 83036 HEMOGLOBIN GLYCOSYLATED A1C: CPT

## 2019-05-24 PROCEDURE — 82607 VITAMIN B-12: CPT

## 2019-05-24 PROCEDURE — 96365 THER/PROPH/DIAG IV INF INIT: CPT

## 2019-05-24 PROCEDURE — 87088 URINE BACTERIA CULTURE: CPT

## 2019-05-24 PROCEDURE — 84443 ASSAY THYROID STIM HORMONE: CPT

## 2019-05-24 PROCEDURE — 83540 ASSAY OF IRON: CPT

## 2019-05-24 PROCEDURE — 93010 EKG 12-LEAD: ICD-10-PCS | Mod: ,,, | Performed by: INTERNAL MEDICINE

## 2019-05-24 PROCEDURE — 87086 URINE CULTURE/COLONY COUNT: CPT

## 2019-05-24 PROCEDURE — 82550 ASSAY OF CK (CPK): CPT

## 2019-05-24 PROCEDURE — 84145 PROCALCITONIN (PCT): CPT

## 2019-05-24 PROCEDURE — 81000 URINALYSIS NONAUTO W/SCOPE: CPT

## 2019-05-24 PROCEDURE — 96372 THER/PROPH/DIAG INJ SC/IM: CPT | Mod: 59

## 2019-05-24 PROCEDURE — 82306 VITAMIN D 25 HYDROXY: CPT

## 2019-05-24 PROCEDURE — 93010 ELECTROCARDIOGRAM REPORT: CPT | Mod: ,,, | Performed by: INTERNAL MEDICINE

## 2019-05-24 PROCEDURE — 80061 LIPID PANEL: CPT

## 2019-05-24 PROCEDURE — 83605 ASSAY OF LACTIC ACID: CPT

## 2019-05-24 PROCEDURE — 87186 SC STD MICRODIL/AGAR DIL: CPT

## 2019-05-24 PROCEDURE — 82962 GLUCOSE BLOOD TEST: CPT | Mod: 91

## 2019-05-24 PROCEDURE — 85025 COMPLETE CBC W/AUTO DIFF WBC: CPT

## 2019-05-24 PROCEDURE — 80053 COMPREHEN METABOLIC PANEL: CPT

## 2019-05-24 RX ORDER — ENOXAPARIN SODIUM 100 MG/ML
40 INJECTION SUBCUTANEOUS EVERY 24 HOURS
Status: DISCONTINUED | OUTPATIENT
Start: 2019-05-24 | End: 2019-05-26 | Stop reason: HOSPADM

## 2019-05-24 RX ORDER — SODIUM CHLORIDE 0.9 % (FLUSH) 0.9 %
10 SYRINGE (ML) INJECTION
Status: DISCONTINUED | OUTPATIENT
Start: 2019-05-24 | End: 2019-05-26 | Stop reason: HOSPADM

## 2019-05-24 RX ORDER — MEMANTINE HYDROCHLORIDE 5 MG/1
10 TABLET ORAL 2 TIMES DAILY
Status: DISCONTINUED | OUTPATIENT
Start: 2019-05-24 | End: 2019-05-26 | Stop reason: HOSPADM

## 2019-05-24 RX ORDER — GLUCAGON 1 MG
1 KIT INJECTION
Status: DISCONTINUED | OUTPATIENT
Start: 2019-05-24 | End: 2019-05-26 | Stop reason: HOSPADM

## 2019-05-24 RX ORDER — DONEPEZIL HYDROCHLORIDE 5 MG/1
10 TABLET, FILM COATED ORAL DAILY
Status: DISCONTINUED | OUTPATIENT
Start: 2019-05-25 | End: 2019-05-26 | Stop reason: HOSPADM

## 2019-05-24 RX ORDER — DOCUSATE SODIUM 50 MG/5ML
100 LIQUID ORAL DAILY
Status: DISCONTINUED | OUTPATIENT
Start: 2019-05-25 | End: 2019-05-26 | Stop reason: HOSPADM

## 2019-05-24 RX ORDER — RAMELTEON 8 MG/1
8 TABLET ORAL NIGHTLY PRN
Status: DISCONTINUED | OUTPATIENT
Start: 2019-05-24 | End: 2019-05-26 | Stop reason: HOSPADM

## 2019-05-24 RX ORDER — IBUPROFEN 200 MG
24 TABLET ORAL
Status: DISCONTINUED | OUTPATIENT
Start: 2019-05-24 | End: 2019-05-26 | Stop reason: HOSPADM

## 2019-05-24 RX ORDER — IBUPROFEN 200 MG
16 TABLET ORAL
Status: DISCONTINUED | OUTPATIENT
Start: 2019-05-24 | End: 2019-05-26 | Stop reason: HOSPADM

## 2019-05-24 RX ADMIN — SODIUM CHLORIDE, SODIUM LACTATE, POTASSIUM CHLORIDE, AND CALCIUM CHLORIDE 1000 ML: .6; .31; .03; .02 INJECTION, SOLUTION INTRAVENOUS at 08:05

## 2019-05-24 RX ADMIN — MEMANTINE 10 MG: 5 TABLET ORAL at 09:05

## 2019-05-24 RX ADMIN — CEFTRIAXONE 1 G: 1 INJECTION, SOLUTION INTRAVENOUS at 06:05

## 2019-05-24 RX ADMIN — ENOXAPARIN SODIUM 40 MG: 100 INJECTION SUBCUTANEOUS at 08:05

## 2019-05-24 NOTE — ED PROVIDER NOTES
Encounter Date: 5/24/2019       History     Chief Complaint   Patient presents with    Fatigue     wife reports that tuesday she noticed pt was weak and that he wouldnt stand up. reports that wednesday pt was able to walk. wife then reports that on thursday at 1100 pt was weak again and had mild slurring speech. pt is not noted to have any unilateral weakness or facial droop. pt has hx of dementia        Pt is a 78 yo M with pmhx of Alzheimer's dementia who presents to the ED with the complaint of fatigue. Pt's daughter and wife, at bedside, state that the patient has not been acting like his normal self over the past 2-3 days. Family states that the patient has had a change in his gait (they describe as more short stepped), to the point of pt refusing to walk. They also report urinary incontinence over the past 2-3 days as well (which is not pt's baseline). They deny another concerns, and on my questioning, the patient denies any specific complaints.     The history is provided by the patient.     Review of patient's allergies indicates:  No Known Allergies  No past medical history on file.  Past Surgical History:   Procedure Laterality Date    COLONOSCOPY  2011     No family history on file.  Social History     Tobacco Use    Smoking status: Never Smoker   Substance Use Topics    Alcohol use: No    Drug use: Not on file     Review of Systems   Unable to perform ROS: Dementia       Physical Exam     Initial Vitals [05/24/19 1422]   BP Pulse Resp Temp SpO2   116/64 76 20 97.9 °F (36.6 °C) 97 %      MAP       --         Physical Exam    Constitutional: He is not diaphoretic. No distress.   Thin, frail appearing male  No acute distress    HENT:   Head: Normocephalic and atraumatic.   Right Ear: External ear normal.   Left Ear: External ear normal.   Nose: Nose normal.   Mouth/Throat: Oropharynx is clear and moist. No oropharyngeal exudate.   Mild cerumen to R TM; L TM clear    Eyes: Conjunctivae and EOM are  normal. Pupils are equal, round, and reactive to light.   Neck: Normal range of motion. Neck supple. No thyromegaly present. No tracheal deviation present.   Cardiovascular: Normal rate, regular rhythm, normal heart sounds and intact distal pulses.   Pulmonary/Chest: Breath sounds normal.   Lungs clear to auscultation bilaterally   Abdominal: Soft. Bowel sounds are normal.   Musculoskeletal: Normal range of motion.   Neurological: He is alert. He has normal strength and normal reflexes. No cranial nerve deficit. GCS score is 15. GCS eye subscore is 4. GCS verbal subscore is 5. GCS motor subscore is 6.   Oriented to person only  Strength 5/5 throughout  No facial droop  No pronator drift  Unable to assess gait   Pt following commands appropriately    Skin: Skin is warm and dry. Capillary refill takes less than 2 seconds.   Psychiatric: He has a normal mood and affect.         ED Course   Procedures  Labs Reviewed   CBC W/ AUTO DIFFERENTIAL - Abnormal; Notable for the following components:       Result Value    Mean Corpuscular Hemoglobin 31.5 (*)     Gran% 75.2 (*)     Lymph% 15.4 (*)     All other components within normal limits   COMPREHENSIVE METABOLIC PANEL - Abnormal; Notable for the following components:    Total Bilirubin 2.1 (*)     Alkaline Phosphatase 149 (*)     ALT 5 (*)     All other components within normal limits   URINALYSIS - Abnormal; Notable for the following components:    Color, UA Brown (*)     Appearance, UA Hazy (*)     Specific Gravity, UA >=1.030 (*)     Ketones, UA 1+ (*)     Nitrite, UA Positive (*)     Urobilinogen, UA 2.0-3.0 (*)     Leukocytes, UA 1+ (*)     All other components within normal limits   URINALYSIS MICROSCOPIC - Abnormal; Notable for the following components:    WBC, UA 15 (*)     Bacteria Moderate (*)     All other components within normal limits   TROPONIN I   TSH   LACTIC ACID, PLASMA   CK   AMMONIA        ECG Results          EKG 12-lead (In process)  Result time  05/24/19 15:51:26    In process by Interface, Lab In [facility] (05/24/19 15:51:26)                 Narrative:    Test Reason : R53.83,    Vent. Rate : 065 BPM     Atrial Rate : 065 BPM     P-R Int : 134 ms          QRS Dur : 072 ms      QT Int : 384 ms       P-R-T Axes : 087 084 086 degrees     QTc Int : 399 ms    Normal sinus rhythm with sinus arrhythmia  Nonspecific T wave abnormality  Abnormal ECG  No previous ECGs available    Referred By: [email or web address]   SELF           Confirmed By:                             Imaging Results          CT Head Without Contrast (Final result)  Result time 05/24/19 16:01:25    Final result by Dwayne Wolf MD (05/24/19 16:01:25)                 Impression:      No acute large vascular territory infarct or intracranial hemorrhage identified.  Further evaluation/follow-up as warranted.    Generalized cerebral volume loss and supratentorial white matter mild chronic small vessel ischemic change.    Ventriculomegaly out of proportion to sulcal prominence, nonspecific but can be seen with normal pressure hydrocephalus.  Correlate clinically.      Electronically signed by: Dwayne Wolf MD  Date:    05/24/2019  Time:    16:01             Narrative:    EXAMINATION:  CT HEAD WITHOUT CONTRAST    CLINICAL HISTORY:  Confusion/delirium, altered LOC, unexplained;    TECHNIQUE:  Low dose axial CT images obtained throughout the head without intravenous contrast. Sagittal and coronal reconstructions were performed.    COMPARISON:  None.    FINDINGS:  Intracranial compartment:    There is generalized cerebral volume loss.  The degree of ventriculomegaly is out of proportion to sulcal prominence.  The ventricles are otherwise midline without distortion by mass effect or evidence of acute hydrocephalus.  No extra-axial blood or fluid collections.    Brain appears normally formed.  Mild degree of patchy hypoattenuation of the bilateral subcortical and periventricular white matter likely sequela of chronic  small vessel ischemic change in this age group.  No parenchymal mass, hemorrhage, edema or major vascular distribution infarct.  Mild calcific atherosclerosis of the bilateral cavernous ICAs.    Skull/extracranial contents (limited evaluation): No fracture.  Mucosal thickening within the left frontal sinus.  Mastoid air cells and remaining imaged paranasal sinuses are essentially clear.  Probable prior surgery to the bilateral ocular lenses.                               X-Ray Chest 1 View (Final result)  Result time 05/24/19 15:26:45    Final result by Reuben Ruiz MD (05/24/19 15:26:45)                 Impression:      1. No acute radiographic findings in the chest on this single view.  2. Probable granuloma projecting over the right middle lung zone.      Electronically signed by: Reuben Ruiz MD  Date:    05/24/2019  Time:    15:26             Narrative:    EXAMINATION:  XR CHEST 1 VIEW    CLINICAL HISTORY:  Other fatigue    TECHNIQUE:  Single frontal view of the chest was performed.    COMPARISON:  None.    FINDINGS:  Cardiac monitoring leads project over the bilateral hemithoraces.  Mediastinal structures are midline.  Hilar contours are unremarkable.  There is calcification of the aortic arch.  Cardiac silhouette is normal in size.  Lung volumes are symmetric.  Nodular high-density focus projects over the right middle lung zone, probably a granuloma.  No consolidation.  No pneumothorax or pleural effusions.  No free air beneath the diaphragm.  Mild degenerative changes of the spine noted.                                 Medical Decision Making:   Clinical Tests:   Lab Tests: Ordered and Reviewed  Radiological Study: Ordered and Reviewed  ED Management:  - Pt not at baseline per family members at bedside  -  Labs notable for nitrite positive urine, elevated Tbili, but otherwise WNL   - EKG without significant ST elevation/depression/T wave changes; no STEMI; ventricular rate 65  - CXR without acute  cardiopulmonary process per my interpretation, final radiology read  - CT head WO negative for acute bleed or mass; generalized cerebral volume loss and supratentorial white matter mild chronic small vessel ischemic change; ventriculomegaly out of proportion to sulcal prominence, nonspecific but can be seen with normal pressure hydrocephalus  - Discussed case with on-call neurologist (Dr. Maynard) who reviewed pt's CT scan imaging. After taking into account the patient's clinical picture, she did not feel that the patient has a diagnosis of NPH, rather perhaps pt's UTI is the cause of pt's altered mental status  - Pt administered IV Rocephin in ED for UTI  - Discussed case with [facility] IM who will admit patient  - Pt's family in agreement with plan for admission                         Clinical Impression:     1. Urinary tract infection without hematuria, site unspecified    2. Fatigue    3. Altered mental status, unspecified altered mental status type                            Jessee Cisneros MD  05/24/19 1900       Jessee Cisneros MD  05/24/19 1910

## 2019-05-24 NOTE — ED TRIAGE NOTES
Patient presents to ER w/ c/o weakness x3 days; pt wife reports history of dementia, states on Tuesday she noted that he was having trouble walking to the bathroom and using the bathroom, states patient was eating without assistance at this time; On Wednesday family reports patient able to walk without assistance and preform ADL's without assistance; On Thursday patient's family reported that patient refused to feed himself, but allowed family to feed him, no difficulty swallowing/choking noted; Today patient was requiring assistance to walk, refused to eat, and needed assistance to preform ADL's so family presented to ER for evaluation. Patient vitals stable at this time, patient follows commands, alert only to self; Patient in gown lying on stretcher.

## 2019-05-24 NOTE — TELEPHONE ENCOUNTER
----- Message from Cristine Dangelo sent at 5/24/2019  9:34 AM CDT -----  PHARMACY CALL NEED REFILL ON donepezil (ARICEPT) 10 MG tablet

## 2019-05-24 NOTE — H&P
[facility] Medicine H&P Note     Admitting Team: [facility] [place] Hospitalist Team A  Attending Physician: Sahara   Resident: Floyd  Intern: Michael    Date of Admit: 5/24/2019    Chief Complaint     AMS x3 days    Subjective:      History of Present Illness:  Héctor Harvey is a 80yo male with PMH of advanced Alzheimer's dementia and Gilbert's disease    The patient was in their usual state of health (lives with wife; walking unassisted, independent with ADLs, often confused and significant short term memory loss) until 2 days PTP when wife noticed pt just seemed to be a little weaker than usual and was having difficulty walking, was unable to feed or shave himself, all of which he typically does on his own. Per wife and daughter at bedside, pt just wasn't acting quite like himself and getting progressively weaker. On day of admit, pt's son had to physically carry the pt to the ED.     Per wife, pt does have occasional urinary incontinence, but this has been going on for several months/years now, no worse in the past few days than previously. Pt did not complain of any urinary sx, though per family, pt would likely not be able to say if he had sx. Denies saddle anesthesia or other numbness/tingling. No bowel incontinence. No recent F/C, N/V/D, or sick contacts.      Past Medical History:  Alzheimer dementia  Gilbert's disease    Past Surgical History:  None    Allergies:  NKDA    Home Medications:  Prior to Admission medications    Medication Sig Start Date End Date Taking? Authorizing Provider   cholecalciferol, vitamin D3, 1,000 unit capsule Take 1 capsule (1,000 Units total) by mouth once daily. 10/20/16   Corinna Roy MD   donepezil (ARICEPT) 10 MG tablet Take 1 tablet (10 mg total) by mouth once daily. 7/24/18   Corinna Roy MD   memantine (NAMENDA) 10 MG Tab Take 1 tablet (10 mg total) by mouth 2 (two) times daily. 8/16/18 8/16/19  Corinna Roy MD       Family History:  No family history on  "file.    Social History:  Social History     Tobacco Use    Smoking status: Never Smoker   Substance Use Topics    Alcohol use: No    Drug use: Not on file       Review of Systems:  Pertinent items are noted in HPI. All other systems are reviewed and are negative.    Health Maintaince :   Primary Care Physician: Kirill    Immunizations:   TDap unsure if UTD  Flu UTD   Pna UTD    Cancer Screening:  Colonoscopy: UTD, 10 years ago WNL     Objective:   Last 24 Hour Vital Signs:  BP  Min: 106/59  Max: 120/65  Temp  Av.7 °F (36.5 °C)  Min: 97.4 °F (36.3 °C)  Max: 97.9 °F (36.6 °C)  Pulse  Av  Min: 71  Max: 76  Resp  Av  Min: 18  Max: 22  SpO2  Av.8 %  Min: 97 %  Max: 100 %  Height  Av' 5" (165.1 cm)  Min: 5' 5" (165.1 cm)  Max: 5' 5" (165.1 cm)  Weight  Av.7 kg (103 lb)  Min: 46.7 kg (103 lb)  Max: 46.7 kg (103 lb)  Body mass index is 17.14 kg/m².  No intake/output data recorded.    Physical Examination:  GEN-oriented to person only, NAD, laying comfortably in bed  HEENT-PERRL, EOMI, MMM, throat without erythema or exudates  NECK-no thyromegaly or other masses  HEART-RRR, no murmurs or rubs  RESP-CTAB, normal work of breathing, no wheezes, crackles, or rhonchi  ABD-soft, NT, ND, +BS; no masses or HSM  EXT-no clubbing, cyanosis, or edema; 2+ DP/PT and radial pulses  NEURO-3/5 strength in all 4 extremities; light touch sensation intact and symmetric  SKIN-warm and dry; no rashes    Laboratory:  Most Recent Data:  CBC:   Lab Results   Component Value Date    WBC 10.05 2019    HGB 14.9 2019    HCT 44.4 2019     2019    MCV 94 2019    RDW 13.7 2019     WBC Differential: 75.2 % N, 0 % Bands, 15.4 % L, 8.3 % M, 0.8 % Eo, 0.3 % Baso, 0 additional cells seen    BMP:   Lab Results   Component Value Date     2019    K 4.4 2019     2019    CO2 28 2019    BUN 15 2019    CREATININE 0.8 2019    GLU 96 2019    " CALCIUM 9.4 05/24/2019    MG 2.2 03/02/2018     LFTs:   Lab Results   Component Value Date    PROT 6.3 05/24/2019    ALBUMIN 3.8 05/24/2019    BILITOT 2.1 (H) 05/24/2019    AST 14 05/24/2019    ALKPHOS 149 (H) 05/24/2019    ALT 5 (L) 05/24/2019     Coags: No results found for: INR, PROTIME, PTT  FLP: No results found for: CHOL, HDL, LDLCALC, TRIG, CHOLHDL  DM:   Lab Results   Component Value Date    CREATININE 0.8 05/24/2019     Thyroid:   Lab Results   Component Value Date    TSH 1.995 05/24/2019     Anemia: No results found for: IRON, TIBC, FERRITIN, [id number], FOLATE  Cardiac:   Lab Results   Component Value Date    TROPONINI <0.006 05/24/2019     Urinalysis:   Lab Results   Component Value Date    COLORU Brown (A) 05/24/2019    SPECGRAV >=1.030 (A) 05/24/2019    NITRITE Positive (A) 05/24/2019    KETONESU 1+ (A) 05/24/2019    UROBILINOGEN 2.0-3.0 (A) 05/24/2019    WBCUA 15 (H) 05/24/2019       Trended Lab Data:  Recent Labs   Lab 05/24/19  1517   WBC 10.05   HGB 14.9   HCT 44.4      MCV 94   RDW 13.7      K 4.4      CO2 28   BUN 15   CREATININE 0.8   GLU 96   PROT 6.3   ALBUMIN 3.8   BILITOT 2.1*   AST 14   ALKPHOS 149*   ALT 5*       Trended Cardiac Data:  Recent Labs   Lab 05/24/19  1517   TROPONINI <0.006       Microbiology Data:  Urine cx  Blood cx    Other Results:  EKG (my interpretation): NSR with t-wave flattening in V1, V2, I    Radiology:  Imaging Results          CT Head Without Contrast (Final result)  Result time 05/24/19 16:01:25    Final result by Dwayne Wolf MD (05/24/19 16:01:25)                 Impression:      No acute large vascular territory infarct or intracranial hemorrhage identified.  Further evaluation/follow-up as warranted.    Generalized cerebral volume loss and supratentorial white matter mild chronic small vessel ischemic change.    Ventriculomegaly out of proportion to sulcal prominence, nonspecific but can be seen with normal pressure hydrocephalus.   Correlate clinically.      Electronically signed by: Dwayne Wolf MD  Date:    05/24/2019  Time:    16:01             Narrative:    EXAMINATION:  CT HEAD WITHOUT CONTRAST    CLINICAL HISTORY:  Confusion/delirium, altered LOC, unexplained;    TECHNIQUE:  Low dose axial CT images obtained throughout the head without intravenous contrast. Sagittal and coronal reconstructions were performed.    COMPARISON:  None.    FINDINGS:  Intracranial compartment:    There is generalized cerebral volume loss.  The degree of ventriculomegaly is out of proportion to sulcal prominence.  The ventricles are otherwise midline without distortion by mass effect or evidence of acute hydrocephalus.  No extra-axial blood or fluid collections.    Brain appears normally formed.  Mild degree of patchy hypoattenuation of the bilateral subcortical and periventricular white matter likely sequela of chronic small vessel ischemic change in this age group.  No parenchymal mass, hemorrhage, edema or major vascular distribution infarct.  Mild calcific atherosclerosis of the bilateral cavernous ICAs.    Skull/extracranial contents (limited evaluation): No fracture.  Mucosal thickening within the left frontal sinus.  Mastoid air cells and remaining imaged paranasal sinuses are essentially clear.  Probable prior surgery to the bilateral ocular lenses.                               X-Ray Chest 1 View (Final result)  Result time 05/24/19 15:26:45    Final result by Reuben Ruiz MD (05/24/19 15:26:45)                 Impression:      1. No acute radiographic findings in the chest on this single view.  2. Probable granuloma projecting over the right middle lung zone.      Electronically signed by: Reuben Ruiz MD  Date:    05/24/2019  Time:    15:26             Narrative:    EXAMINATION:  XR CHEST 1 VIEW    CLINICAL HISTORY:  Other fatigue    TECHNIQUE:  Single frontal view of the chest was performed.    COMPARISON:  None.    FINDINGS:  Cardiac  monitoring leads project over the bilateral hemithoraces.  Mediastinal structures are midline.  Hilar contours are unremarkable.  There is calcification of the aortic arch.  Cardiac silhouette is normal in size.  Lung volumes are symmetric.  Nodular high-density focus projects over the right middle lung zone, probably a granuloma.  No consolidation.  No pneumothorax or pleural effusions.  No free air beneath the diaphragm.  Mild degenerative changes of the spine noted.                                 Assessment:     Héctor Harvey is a 79 y.o. male with:     Plan:     Acute encephalopathy on chronic dementia  - Pt with reported weakness and AMS x3 days per family  - chronic dementia at baseline  - VSS on presentation; SIRS 0; qSOFA 1  - lactate, CPK, TSH, and ammonia WNL  - UTI as below  - head CT without acute pathology; ventriculomegaly concerning for NPH  - CXR with probable in RML  - IVF resuscitation and abx as below    UTI  - UA +nitrites, 15 WBC  - urine cx ordered, if not resulted by time of discharge, PCP can f/u at already scheduled appt on Monday  - got rocephin x1 in ED > will continue while inpatient, and plan to discharge on po abx    Volume depletion  - pt mildly hypovolemic on exam  - gentle IVF resuscitation     Weakness  - pt with reported subacute progression of weakness and inability to walk over 3 days PTA  - consult PT/OT; unless pt can stand/walk in am, will then just do  referral for PT/OT    Alzheimer's dementia  - continue home meds (donepezil, memantine)    Gilbert's disease  - Alkphos 149, Tbili 2.1 (baseline alkphos 117-127 and Tbili 1.9-2.2 in 2017)  - pt at baseline w/known hx Gilbert's    Vitamin D deficiency  - home med: cholecalciferol > continue  - check Vit D level    HCM  - check A1c, lipid panel  - Tdap prior to discharge    Diet: dental soft  PPX: lovenox    Dispo: pending clinical improvement in mental status, likely home tomorrow on po abx    Code Status:     Full    Sabiha  JIMMY Rodriguez MD  [facility] Internal Medicine -I    [facility] Medicine Hospitalist Pager numbers:   [facility] Hospitalist Medicine Team A (Jer/Sahara): [phone]  [facility] Hospitalist Medicine Team B (Oh/Parminder):  [phone]

## 2019-05-25 PROBLEM — E55.9 VITAMIN D DEFICIENCY: Status: ACTIVE | Noted: 2019-05-25

## 2019-05-25 LAB
25(OH)D3+25(OH)D2 SERPL-MCNC: 24 NG/ML (ref 30–96)
ALBUMIN SERPL BCP-MCNC: 3.4 G/DL (ref 3.5–5.2)
ALP SERPL-CCNC: 130 U/L (ref 55–135)
ALT SERPL W/O P-5'-P-CCNC: <5 U/L (ref 10–44)
ANION GAP SERPL CALC-SCNC: 9 MMOL/L (ref 8–16)
AST SERPL-CCNC: 12 U/L (ref 10–40)
BASOPHILS # BLD AUTO: 0.03 K/UL (ref 0–0.2)
BASOPHILS NFR BLD: 0.3 % (ref 0–1.9)
BILIRUB SERPL-MCNC: 2.9 MG/DL (ref 0.1–1)
BUN SERPL-MCNC: 14 MG/DL (ref 8–23)
CALCIUM SERPL-MCNC: 9.2 MG/DL (ref 8.7–10.5)
CHLORIDE SERPL-SCNC: 106 MMOL/L (ref 95–110)
CO2 SERPL-SCNC: 25 MMOL/L (ref 23–29)
CREAT SERPL-MCNC: 0.7 MG/DL (ref 0.5–1.4)
DIFFERENTIAL METHOD: ABNORMAL
EOSINOPHIL # BLD AUTO: 0.1 K/UL (ref 0–0.5)
EOSINOPHIL NFR BLD: 0.7 % (ref 0–8)
ERYTHROCYTE [DISTWIDTH] IN BLOOD BY AUTOMATED COUNT: 13.2 % (ref 11.5–14.5)
EST. GFR  (AFRICAN AMERICAN): >60 ML/MIN/1.73 M^2
EST. GFR  (NON AFRICAN AMERICAN): >60 ML/MIN/1.73 M^2
FOLATE SERPL-MCNC: 14.8 NG/ML (ref 4–24)
GLUCOSE SERPL-MCNC: 85 MG/DL (ref 70–110)
HCT VFR BLD AUTO: 43.3 % (ref 40–54)
HGB BLD-MCNC: 14.3 G/DL (ref 14–18)
IRON SERPL-MCNC: 32 UG/DL (ref 45–160)
LYMPHOCYTES # BLD AUTO: 1.5 K/UL (ref 1–4.8)
LYMPHOCYTES NFR BLD: 12.9 % (ref 18–48)
MCH RBC QN AUTO: 30.9 PG (ref 27–31)
MCHC RBC AUTO-ENTMCNC: 33 G/DL (ref 32–36)
MCV RBC AUTO: 94 FL (ref 82–98)
MONOCYTES # BLD AUTO: 0.7 K/UL (ref 0.3–1)
MONOCYTES NFR BLD: 6.1 % (ref 4–15)
NEUTROPHILS # BLD AUTO: 9.5 K/UL (ref 1.8–7.7)
NEUTROPHILS NFR BLD: 79.8 % (ref 38–73)
PLATELET # BLD AUTO: 206 K/UL (ref 150–350)
PMV BLD AUTO: 10.8 FL (ref 9.2–12.9)
POCT GLUCOSE: 105 MG/DL (ref 70–110)
POCT GLUCOSE: 115 MG/DL (ref 70–110)
POCT GLUCOSE: 121 MG/DL (ref 70–110)
POCT GLUCOSE: 89 MG/DL (ref 70–110)
POTASSIUM SERPL-SCNC: 4.3 MMOL/L (ref 3.5–5.1)
PROT SERPL-MCNC: 6 G/DL (ref 6–8.4)
RBC # BLD AUTO: 4.63 M/UL (ref 4.6–6.2)
SATURATED IRON: 12 % (ref 20–50)
SODIUM SERPL-SCNC: 140 MMOL/L (ref 136–145)
TOTAL IRON BINDING CAPACITY: 263 UG/DL (ref 250–450)
TRANSFERRIN SERPL-MCNC: 178 MG/DL (ref 200–375)
VIT B12 SERPL-MCNC: 537 PG/ML (ref 210–950)
WBC # BLD AUTO: 11.88 K/UL (ref 3.9–12.7)

## 2019-05-25 PROCEDURE — G0378 HOSPITAL OBSERVATION PER HR: HCPCS

## 2019-05-25 PROCEDURE — 96376 TX/PRO/DX INJ SAME DRUG ADON: CPT

## 2019-05-25 PROCEDURE — 25000003 PHARM REV CODE 250: Performed by: STUDENT IN AN ORGANIZED HEALTH CARE EDUCATION/TRAINING PROGRAM

## 2019-05-25 PROCEDURE — 96372 THER/PROPH/DIAG INJ SC/IM: CPT | Mod: 59

## 2019-05-25 PROCEDURE — 97165 OT EVAL LOW COMPLEX 30 MIN: CPT

## 2019-05-25 PROCEDURE — 97162 PT EVAL MOD COMPLEX 30 MIN: CPT

## 2019-05-25 PROCEDURE — 93010 EKG 12-LEAD: ICD-10-PCS | Mod: ,,, | Performed by: INTERNAL MEDICINE

## 2019-05-25 PROCEDURE — 93005 ELECTROCARDIOGRAM TRACING: CPT

## 2019-05-25 PROCEDURE — 94761 N-INVAS EAR/PLS OXIMETRY MLT: CPT

## 2019-05-25 PROCEDURE — 63600175 PHARM REV CODE 636 W HCPCS: Performed by: STUDENT IN AN ORGANIZED HEALTH CARE EDUCATION/TRAINING PROGRAM

## 2019-05-25 PROCEDURE — 36415 COLL VENOUS BLD VENIPUNCTURE: CPT

## 2019-05-25 PROCEDURE — 93010 ELECTROCARDIOGRAM REPORT: CPT | Mod: ,,, | Performed by: INTERNAL MEDICINE

## 2019-05-25 PROCEDURE — 80053 COMPREHEN METABOLIC PANEL: CPT

## 2019-05-25 PROCEDURE — 97530 THERAPEUTIC ACTIVITIES: CPT

## 2019-05-25 PROCEDURE — 85025 COMPLETE CBC W/AUTO DIFF WBC: CPT

## 2019-05-25 RX ORDER — DOCUSATE SODIUM 100 MG/1
100 CAPSULE, LIQUID FILLED ORAL 2 TIMES DAILY
Qty: 180 CAPSULE | Refills: 2 | Status: SHIPPED | OUTPATIENT
Start: 2019-05-25 | End: 2019-08-23

## 2019-05-25 RX ORDER — CIPROFLOXACIN 500 MG/1
500 TABLET ORAL 2 TIMES DAILY
Qty: 14 TABLET | Refills: 0 | Status: SHIPPED | OUTPATIENT
Start: 2019-05-25 | End: 2019-06-01

## 2019-05-25 RX ADMIN — MEMANTINE 10 MG: 5 TABLET ORAL at 09:05

## 2019-05-25 RX ADMIN — ENOXAPARIN SODIUM 40 MG: 100 INJECTION SUBCUTANEOUS at 04:05

## 2019-05-25 RX ADMIN — CEFTRIAXONE 1 G: 1 INJECTION, SOLUTION INTRAVENOUS at 10:05

## 2019-05-25 RX ADMIN — DONEPEZIL HYDROCHLORIDE 10 MG: 5 TABLET, FILM COATED ORAL at 08:05

## 2019-05-25 RX ADMIN — MEMANTINE 10 MG: 5 TABLET ORAL at 08:05

## 2019-05-25 RX ADMIN — DOCUSATE SODIUM 100 MG: 50 LIQUID ORAL at 08:05

## 2019-05-25 NOTE — PT/OT/SLP EVAL
Occupational Therapy   Evaluation    Name: Héctor Harvey  MRN: [id number]  Admitting Diagnosis:  Acute encephalopathy      Recommendations:     Discharge Recommendations: home health OT, home with home health, home health PT  Discharge Equipment Recommendations:  commode  Barriers to discharge:  None    Assessment:     Héctor Harvey is a 79 y.o. male with a medical diagnosis of Acute encephalopathy.  He presents with debility, decline in ADLS and fx mobility; hx dementia but was functioning at supervision/indep level with ADLS and fx mobility without a device PTO.  He would benefit from HHOT post acute stay.  DME recommendatons:  BSc.  Continue with OT POC. nPerformance deficits affecting function: weakness, impaired endurance, impaired self care skills, impaired functional mobilty, gait instability, impaired balance, impaired cognition, decreased lower extremity function, decreased safety awareness.      Rehab Prognosis: Good; patient would benefit from acute skilled OT services to address these deficits and reach maximum level of function.       Plan:     Patient to be seen 5 x/week to address the above listed problems via self-care/home management, therapeutic exercises, therapeutic activities  · Plan of Care Expires: 06/25/19  · Plan of Care Reviewed with: patient, spouse    Subjective     Chief Complaint: none  Patient/Family Comments/goals: none    Occupational Profile:  Living Environment: Lives with wife in [facility] with 1 step; has walk n shower with built in seat   Previous level of function: Per wife, pt was indep-with ADLS except supervision with showering; wife does IADLS; per chart, pt. has dementia with confusion and STM loss; occasional urinary incontinence..    Roles and Routines: active in the home  Equipment Used at Home:  shower chair (built n seat in walk n shower)  Assistance upon Discharge: wife    Pain/Comfort:  · Pain Rating 1: 0/10  · Pain Rating Post-Intervention 1: 0/10    Patients cultural,  spiritual, Pentecostalism conflicts given the current situation: no    Objective:     Communicated with:  prior to session.  Patient found seated EOB with PT with bed alarm upon OT entry to room.    General Precautions: Standard, fall, hearing impaired   Orthopedic Precautions:N/A   Braces: N/A     Occupational Performance:    Bed Mobility:    · Patient completed Rolling/Turning to Left with  moderate assistance and poor initiation  · Patient completed Scooting/Bridging with moderate assistance  · Patient completed Supine to Sit with moderate assistance    Functional Mobility/Transfers:  · Patient completed Sit <> Stand Transfer with moderate assistance  with  hand-held assist   · Patient completed Bed <> Chair Transfer using Step Transfer technique with moderate assistance with hand-held assist  · Functional Mobility: shuffling feet to step to BS chair; unable to do functional distance walking; hesisitant and gravitation insecurity with transitional movements.    Activities of Daily Living:  · Feeding:  per wife, she had to fed pt breakfast this morning .  · Grooming: minimum assistance washed face with max vc, tactile cues to initiate, follow through with instrcution/cues   · Lower Body Dressing: total assistance socks  · Toileting: total assistance     Cognitive/Visual Perceptual:  Cognitive/Psychosocial Skills:     -       Oriented to: Person   -       Follows Commands/attention:Inattentive and followed 1 step 25%  -       Communication: not verbalizing  -       Memory: Impaired STM, Impaired LTM and Poor immediate recall  -       Safety awareness/insight to disability: impaired   -       Mood/Affect/Coping skills/emotional control: Cooperative and Flat affect  Visual/Perceptual:      -Intact .    Physical Exam:  Balance:    -       sitting:  posterior dysequilbrium; feet lifted of floor and loses balance backwards; retropulsion   Standing:  Poor balance ; posterior WS; same as above  Postural examination/scapula  alignment:    -       Rounded shoulders  Dominant hand:    -       right  Upper Extremity Range of Motion:     -       Right Upper Extremity: WFL  -       Left Upper Extremity: WFL  Upper Extremity Strength:    -       Right Upper Extremity: WFL  -       Left Upper Extremity: WFL   Strength:    -       Right Upper Extremity: WFL  -       Left Upper Extremity: WFL    AMPAC 6 Click ADL:  AMPAC Total Score: 12    Treatment & Education:  Role of OT and POC  Educated wife to call nurse if pt wanted back to ebd and let pt sit up at least to eat lunch and educated in backward chaining tech to get pt to initiate self feeding.   Education:    Patient left up in chair with all lines intact, call button in reach, chair alarm on, nurse notified and wife present    GOALS:   Multidisciplinary Problems     Occupational Therapy Goals        Problem: Occupational Therapy Goal    Goal Priority Disciplines Outcome Interventions   Occupational Therapy Goal     OT, PT/OT Ongoing (interventions implemented as appropriate)    Description:  Goals to be met by: 6/1/2019    Patient will increase functional independence with ADLs by performing:    Feeding with Supervision.  UE Dressing with Supervision.  LE Dressing with Supervision.  Grooming while standing with Supervision.  Toileting from toilet with Supervision for hygiene and clothing management.   Step transfer with Supervision  Toilet transfer to toilet with Supervision.  Upper extremity exercise program x10 reps per handout, with supervision.                      History:     History reviewed. No pertinent past medical history.    Past Surgical History:   Procedure Laterality Date    COLONOSCOPY  2011       Time Tracking:     OT Date of Treatment: 05/25/19  OT Start Time: 1100  OT Stop Time: 1110  OT Total Time (min): 10 min    Billable Minutes:Evaluation 10  Total Time 10    Tamera Stone OT  5/25/2019

## 2019-05-25 NOTE — PLAN OF CARE
Spoke with [facility]-Neurology resident on call Dr. Estrellita Cason. Discussed the patients case of Acute encephalopathy, and concurrent UTI. Patients original CT findings of ventriculomegaly were discussed. As I was advissed, this is a non-specific finding, and NPH a condition this is seen is a diagnosis of exclusion. We discussed the patients clinical improvement with hydration and Antiobiotics, which as per our discussion leans more towards the UTI as the culprit. As per our discussion the patient can be followed up outpatient by PCP and Neurologist for further management, and a MRI can be scheduled outpatient as well. Family is on board with this plan. Patient will be evaluated by Therapy for home needs, and discharged with close f/u, PCP in 48 hours.     Jani Barrientos MD  [facility] Medicine PGY-2

## 2019-05-25 NOTE — PLAN OF CARE
Problem: Adult Inpatient Plan of Care  Goal: Plan of Care Review  Outcome: Ongoing (interventions implemented as appropriate)  Patient awake and oriented to self, safety measures implemented, family at bedside, VSS, no complaints of pain or discomfort, patient incontinent, will continue to monitor.

## 2019-05-25 NOTE — ED NOTES
Admitting MD stated that patient will be admitted to observation. Plan of care discussed with family.

## 2019-05-25 NOTE — PLAN OF CARE
Problem: Physical Therapy Goal  Goal: Physical Therapy Goal  Goals to be met by: 2019     Patient will increase functional independence with mobility by performin. Supine to sit with Stand-by Assistance  2. Sit to stand transfer with Stand-by Assistance  3. Bed to chair transfer with Contact Guard Assistance using Rolling Walker  4. Gait  x 15 feet with Minimal Assistance using Rolling Walker.   4. Lower extremity exercise program x12 reps per handout, with assistance as needed    Outcome: Ongoing (interventions implemented as appropriate)  PT initial evaluation completed. Plan of care and goals established and discussed with pt/wife/RN/MD.  Pt demonstrated reduced alertness at this time; impaired safety awareness c/ inability to follow verbal commands/cues <25% of time. Pt required Mod A c/ supine <>sit; on sitting EOB demo significant posterior lean c/ inability to keep krystian feet on ground as such required min<> mod to maintain sitting balance and sitting postural control. Pt required Mod A for STS; gait 3ft to HOB+3ft bed<>chair  c/ hesitant shuffling gait while demonstrating inability to advance LE c/ VC. Pt deemed not safe to go home from PT stand point at this time; will continue to ff.     Discharge Recommendation: HHPT c/ 24hrs supervision  DME Recommendation: [facility]

## 2019-05-25 NOTE — PROGRESS NOTES
"[facility] Hospital Medicine Progress Note    Primary Team: [facility] Hospitalist Team A  Attending Physician: Genesis Shoemaker MD  Resident: Floyd  Intern: Michael    Subjective:      Pt up and eating breakfast this morning with wife and 2 daughters at bedside. They report that pt just woke up so difficult to determine if back to his baseline yet. Pt denies pain anywhere, but doesn't answer/understand all questions.     Objective:     Last 24 Hour Vital Signs:  BP  Min: 101/61  Max: 127/70  Temp  Av.7 °F (36.5 °C)  Min: 97 °F (36.1 °C)  Max: 98.4 °F (36.9 °C)  Pulse  Av  Min: 70  Max: 84  Resp  Av  Min: 16  Max: 22  SpO2  Av.1 %  Min: 97 %  Max: 100 %  Height  Av' 5" (165.1 cm)  Min: 5' 5" (165.1 cm)  Max: 5' 5" (165.1 cm)  Weight  Av.1 kg (101 lb 10.4 oz)  Min: 44 kg (97 lb)  Max: 47.6 kg (104 lb 15 oz)  No intake/output data recorded.    Physical Examination:  GEN-oriented to person only, NAD, laying comfortably in bed  HEENT-EOMI, MMM, throat without erythema or exudates  NECK-no thyromegaly or other masses  HEART-RRR, no murmurs or rubs  RESP-CTAB, normal work of breathing, no wheezes, crackles, or rhonchi  ABD-soft, NT, ND, +BS; no masses or HSM  EXT-no clubbing, cyanosis, or edema; 2+ DP/PT and radial pulses  NEURO-3/5 strength in all 4 extremities; light touch sensation intact and symmetric  SKIN-warm and dry; no rashes    Laboratory:  Laboratory Data Reviewed: yes  Pertinent Findings:  Trended Lab Data:   Recent Labs   Lab 19  1517   WBC 10.05   HGB 14.9   HCT 44.4      MCV 94   RDW 13.7      K 4.4      CO2 28   BUN 15   CREATININE 0.8   GLU 96   CALCIUM 9.4   PROT 6.3   ALBUMIN 3.8   AST 14   ALT 5*   ALKPHOS 149*   BILITOT 2.1*      Trended Cardiac Data:   Recent Labs   Lab 19  1517   TROPONINI <0.006      Trended Cardiac Data:   Recent Labs   Lab 19  2032 19  2236 19  0627   POCTGLUCOSE 87 115* 89        Microbiology Data Reviewed: " yes  Pertinent Findings:  Urine cx no result    Other Results:  EKG (my interpretation): No new    Radiology Data Reviewed: yes  Pertinent Findings:  CXR: No acute radiographic findings in the chest on this single view. Probable granuloma projecting over the right middle lung zone    CT head: No acute large vascular territory infarct or intracranial hemorrhage identified. Generalized cerebral volume loss and supratentorial white matter mild chronic small vessel ischemic change. Ventriculomegaly out of proportion to sulcal prominence, nonspecific but can be seen with normal pressure hydrocephalus    Current Medications:     Infusions:       Scheduled:   cefTRIAXone (ROCEPHIN) IVPB  1 g Intravenous ED 1 Time    docusate  100 mg Oral Daily    donepezil  10 mg Oral Daily    enoxaparin  40 mg Subcutaneous Daily    memantine  10 mg Oral BID        PRN:  dextrose 50%, dextrose 50%, glucagon (human recombinant), glucose, glucose, ramelteon, sodium chloride 0.9%    Antibiotics and Day Number of Therapy:  Ceftriaxone 5/24    Lines and Day Number of Therapy:  PIV L forearm 20G    Assessment:     Héctor Harvey is a 79 y.o.male with  Patient Active Problem List    Diagnosis Date Noted    Vitamin D deficiency 05/25/2019    Acute encephalopathy 05/24/2019    UTI (urinary tract infection) 05/24/2019    Volume depletion 05/24/2019    Physical deconditioning 05/24/2019    Urinary tract infection without hematuria 05/24/2019    Cerebral ventriculomegaly 05/24/2019    Alzheimer's disease, unspecified 10/20/2016    Gilbert's disease 06/30/2016    Dementia 06/04/2015        Plan:     Acute encephalopathy on chronic dementia  - Pt with reported weakness and AMS x3 days per family  - chronic dementia at baseline  - VSS on presentation; SIRS 0; qSOFA 1  - lactate, CPK, TSH, and ammonia WNL  - UTI as below  - head CT without acute pathology; ventriculomegaly concerning for NPH > will discuss possible LP with family  - CXR  with probable in RML  - IVF resuscitation and abx as below  - neuro consulted     UTI  - UA +nitrites, 15 WBC  - urine cx ordered, if not resulted by time of discharge, PCP can f/u at already scheduled appt on Monday  - got rocephin x1 in ED > will continue while inpatient, and plan to discharge on po abx     Volume depletion  - pt mildly hypovolemic on exam  - gentle IVF resuscitation      Weakness  - pt with reported subacute progression of weakness and inability to walk over 3 days PTA  - consult PT/OT; unless pt can stand/walk in am, will then just do  referral for PT/OT     Alzheimer's dementia  - continue home meds (donepezil, memantine)     Gilbert's disease  - Alkphos 149, Tbili 2.1 (baseline alkphos 117-127 and Tbili 1.9-2.2 in 2017)  - pt at baseline w/known hx Gilbert's     Vitamin D deficiency  - home med: cholecalciferol > continue  - Vit D level low (24)      HCM  - A1c, TSH, B12, lipid panel WNL  - Tdap prior to discharge     Diet: dental soft  PPX: lovenox     Dispo: possible LP; pending neuro and PT/OT recs; possibly home later today    Sabiha Rodriguez MD  [facility] Internal Medicine HO-I    [facility] Medicine Hospitalist Pager numbers:   [facility] Hospitalist Medicine Team A ([place]): [phone]  [facility] Hospitalist Medicine Team B ([place]/Parminder):  [phone]

## 2019-05-25 NOTE — PLAN OF CARE
Progress notes reviewed. Evening rounds completed. Admit questions and med Rec completed. Introduced self as VN for this shift. Educated pt on VN's role in patient's care.  Plan of care reviewed with patient. Opportunity given for pt's questions. No questions or concerns expressed at this time. VN to continue to monitor.

## 2019-05-25 NOTE — ED NOTES
Report called to floor nurse, administered patient his po medications with pudding and small sip of water, approximately 1cc of water. Patient was not able to swallow with the small sip and chewed the po medication. Patient was given pudding to follow medication and  tolerated the soft foods. Patient disconnected from monitor .

## 2019-05-25 NOTE — PLAN OF CARE
Problem: Adult Inpatient Plan of Care  Goal: Plan of Care Review  Outcome: Revised  Patient is oriented to self.  Patient sits up in chair during meals.  Family members at bedside.  Denies pain.  On antibiotics.  Safety is maintained with bed low, wheels locked and side rails up.  Call light within reach.  Will continue to monitor.

## 2019-05-25 NOTE — PLAN OF CARE
Problem: Occupational Therapy Goal  Goal: Occupational Therapy Goal  Goals to be met by: 6/1/2019    Patient will increase functional independence with ADLs by performing:    Feeding with Supervision.  UE Dressing with Supervision.  LE Dressing with Supervision.  Grooming while standing with Supervision.  Toileting from toilet with Supervision for hygiene and clothing management.   Step transfer with Supervision  Toilet transfer to toilet with Supervision.  Upper extremity exercise program x10 reps per handout, with supervision.    Outcome: Ongoing (interventions implemented as appropriate)  OT initial eval completed.  Pt would benefit from HHOT.  DME neds for home: [facility]. Continue with OT POC. Pt not safe to return home with wife today and not at baseline with ADLS and fx mobility.  Continue with OT POC.

## 2019-05-25 NOTE — ED NOTES
Performed oral care with mouth swab and changed patient diaper. Foul smelling urine noted in diaper.

## 2019-05-25 NOTE — PLAN OF CARE
[facility]rology Plan of Care    Initial consult received by primary team Dr Gunter this AM for concerns for 78yo M w hx of dementia (on donepezil and memantine) for NPH with enlarged ventricles seen on CT scan.     Patient here for UTI with AMS that is resolving but is too deconditioned for discharge. Neurologic exam pertinent for difficulties with gait and dizziness that has improved since admission.    Advised to get MRI brain without contrast to assess if any underlying process as NPH is a diagnosis of exclusion and primary team to discuss that decision with family.     Contacted again by primary team that they will have patient followup with neuro as outpatient for MRI and pending therapy reccs before discharge.     Please call us back with any further questions. Otherwise can followup as outpatient for outpatient workup once MRI is complete to followup on any underlying etiologies of enlarged ventricles. If NPH, patient to get symptomatic LP with concurrent eval by PT as an outpatient.     Estrellita Cason MD  [facility]

## 2019-05-25 NOTE — PT/OT/SLP EVAL
Physical Therapy Evaluation    Patient Name:  Héctor Harvey   MRN:  [id number]    Recommendations:     Discharge Recommendations:  home health PT   Discharge Equipment Recommendations: walker, rolling   Barriers to discharge: Decreased caregiver support and sigfnificantly impaired funtional moblity & safety awareness    Assessment:     Héctor Harvey is a 79 y.o. male admitted with a medical diagnosis of Acute encephalopathy.  He presents with the following impairments/functional limitations:  weakness, impaired functional mobilty, impaired cognition, decreased safety awareness, impaired coordination, impaired endurance, gait instability, decreased coordination, impaired balance, decreased lower extremity function, impaired self care skills. Pt demonstrated reduced alertness at this time; impaired safety awareness c/ inability to follow verbal commands/cues <25% of time. Pt required Mod A c/ supine <>sit; on sitting EOB demo significant posterior lean c/ inability to keep krystian feet on ground as such required min<> mod to maintain sitting balance and sitting postural control. Pt required Mod A for STS; gait 3ft to HOB+3ft bed<>chair  c/ hesitant shuffling gait while demonstrating inability to advance LE c/ VC. Pt deemed not safe to go home from PT stand point at this time; will continue to ff.       Rehab Prognosis: Good; patient would benefit from acute skilled PT services to address these deficits and reach maximum level of function.    Recent Surgery: * No surgery found *      Plan:     During this hospitalization, patient to be seen 5 x/week to address the identified rehab impairments via gait training, therapeutic activities, therapeutic exercises, neuromuscular re-education and progress toward the following goals:    · Plan of Care Expires:  06/25/19    Subjective     Chief Complaint: NA  Patient/Family Comments/goals: Agreed to PT POC  Pain/Comfort:  · Pain Rating 1: 0/10  · Pain Rating Post-Intervention 1:  0/10    Patients cultural, spiritual, Scientologist conflicts given the current situation: no    Living Environment:  Pt lives c/ wife in 1SH, 1STE; WIS c/ built-in seat  Prior to admission, patients level of function was Mod I; ambulated c/out AD till 3 days prior; SPV c/ ADLs.  Equipment used at home: shower chair.  DME owned (not currently used): none.  Upon discharge, patient will have assistance from wife/daughter.    Objective:     Communicated with RN prior to session.  Patient found supine with bed alarm, telemetry, peripheral IV  upon PT entry to room.    General Precautions: Standard, fall, hearing impaired(Dementia)   Orthopedic Precautions:N/A   Braces: N/A     Exams:  · Cognitive Exam:  Patient is oriented to Person and reduced alertness; ff verbal commands <25% of time  · Gross Motor Coordination:  impaired c/ functional gait  · Postural Exam:  Patient presented with the following abnormalities:    · -       posterior lean in sitting EOB; c/ krystian feet off the ground  · RLE ROM: WFL  · RLE Strength: grossly tested: 3-/5  · LLE ROM: WFL  · LLE Strength: grossly tested: 3-/5    Functional Mobility:  · Bed Mobility:     · Rolling Right: moderate assistance procedural VC for hand placment  · Scooting: moderate assistance  · Supine to Sit: moderate assistance  · Transfers:     · Sit to Stand:  moderate assistance with rolling walker; procedural cues for weight shift; able to initiate; limited participation and mod VC to pay attention to task  · Bed to Chair: moderate assistance with  rolling walker  using  Stand Pivot; VC for controlled sitting via UE assist.  · Gait: gait 3ft to HOB+3ft bed<>chair  c/ hesitant shuffling gait c/ significantly reduced krystian step length and flat foot landing while demonstrating inability to advance LE c/ VC.    · Balance: sitting EOB- poor+; dynamic gait: poor      Therapeutic Activities and Exercises:   PT eval c/ progressive mobility as detailed above; bed <> chair  transfer.    AM-PAC 6 CLICK MOBILITY  Total Score:12     Patient left up in chair with call button in reach, bed alarm on, RN/MD notified and wife present.    GOALS:   Multidisciplinary Problems     Physical Therapy Goals        Problem: Physical Therapy Goal    Goal Priority Disciplines Outcome Goal Variances Interventions   Physical Therapy Goal     PT, PT/OT Ongoing (interventions implemented as appropriate)     Description:  Goals to be met by: 2019     Patient will increase functional independence with mobility by performin. Supine to sit with Stand-by Assistance  2. Sit to stand transfer with Stand-by Assistance  3. Bed to chair transfer with Contact Guard Assistance using Rolling Walker  4. Gait  x 15 feet with Minimal Assistance using Rolling Walker.   4. Lower extremity exercise program x12 reps per handout, with assistance as needed                      History:     History reviewed. No pertinent past medical history.    Past Surgical History:   Procedure Laterality Date    COLONOSCOPY         Time Tracking:     PT Received On: 19  PT Start Time: 1041     PT Stop Time: 1104  PT Total Time (min): 23 min     Billable Minutes: Evaluation 10 and Therapeutic Activity 13      Robbin Taylor, PT  2019

## 2019-05-26 VITALS
SYSTOLIC BLOOD PRESSURE: 112 MMHG | HEART RATE: 70 BPM | OXYGEN SATURATION: 98 % | DIASTOLIC BLOOD PRESSURE: 64 MMHG | RESPIRATION RATE: 16 BRPM | HEIGHT: 65 IN | WEIGHT: 112.88 LBS | BODY MASS INDEX: 18.81 KG/M2 | TEMPERATURE: 98 F

## 2019-05-26 LAB
POCT GLUCOSE: 102 MG/DL (ref 70–110)
POCT GLUCOSE: 92 MG/DL (ref 70–110)

## 2019-05-26 PROCEDURE — G0378 HOSPITAL OBSERVATION PER HR: HCPCS

## 2019-05-26 PROCEDURE — 94761 N-INVAS EAR/PLS OXIMETRY MLT: CPT

## 2019-05-26 PROCEDURE — 63600175 PHARM REV CODE 636 W HCPCS: Performed by: INTERNAL MEDICINE

## 2019-05-26 PROCEDURE — 96376 TX/PRO/DX INJ SAME DRUG ADON: CPT

## 2019-05-26 PROCEDURE — 63600175 PHARM REV CODE 636 W HCPCS

## 2019-05-26 PROCEDURE — 97116 GAIT TRAINING THERAPY: CPT | Mod: 59

## 2019-05-26 PROCEDURE — 90715 TDAP VACCINE 7 YRS/> IM: CPT

## 2019-05-26 PROCEDURE — 90471 IMMUNIZATION ADMIN: CPT

## 2019-05-26 PROCEDURE — 25000003 PHARM REV CODE 250: Performed by: STUDENT IN AN ORGANIZED HEALTH CARE EDUCATION/TRAINING PROGRAM

## 2019-05-26 PROCEDURE — 97530 THERAPEUTIC ACTIVITIES: CPT

## 2019-05-26 RX ADMIN — DOCUSATE SODIUM 100 MG: 50 LIQUID ORAL at 09:05

## 2019-05-26 RX ADMIN — MEMANTINE 10 MG: 5 TABLET ORAL at 09:05

## 2019-05-26 RX ADMIN — CEFTRIAXONE 1 G: 1 INJECTION, SOLUTION INTRAVENOUS at 09:05

## 2019-05-26 RX ADMIN — CLOSTRIDIUM TETANI TOXOID ANTIGEN (FORMALDEHYDE INACTIVATED), CORYNEBACTERIUM DIPHTHERIAE TOXOID ANTIGEN (FORMALDEHYDE INACTIVATED), BORDETELLA PERTUSSIS TOXOID ANTIGEN (GLUTARALDEHYDE INACTIVATED), BORDETELLA PERTUSSIS FILAMENTOUS HEMAGGLUTININ ANTIGEN (FORMALDEHYDE INACTIVATED), BORDETELLA PERTUSSIS PERTACTIN ANTIGEN, AND BORDETELLA PERTUSSIS FIMBRIAE 2/3 ANTIGEN 0.5 ML: 5; 2; 2.5; 5; 3; 5 INJECTION, SUSPENSION INTRAMUSCULAR at 02:05

## 2019-05-26 RX ADMIN — DONEPEZIL HYDROCHLORIDE 10 MG: 5 TABLET, FILM COATED ORAL at 09:05

## 2019-05-26 NOTE — CONSULTS
[facility] Consult    Please see plan of care dated today 5/25/19.     Estrellita Cason MD  [facility] neurology II

## 2019-05-26 NOTE — PT/OT/SLP PROGRESS
Physical Therapy Treatment    Patient Name:  Héctor Harvey   MRN:  [id number]    Recommendations:     Discharge Recommendations:  home health PT   Discharge Equipment Recommendations: walker, rolling   Barriers to discharge: decreased mobility,strength and endurance    Assessment:     Héctor Harvey is a 79 y.o. male admitted with a medical diagnosis of Acute encephalopathy.  He presents with the following impairments/functional limitations:  weakness, impaired functional mobilty, gait instability, impaired balance, impaired cognition, decreased upper extremity function, decreased lower extremity function, decreased safety awareness, decreased ROM, impaired coordination,pt requires increased assistance with all mobility and only able to take small festinating type steps with Mod A,pt will require 24 hr supervision upon discharge and will require continuing PT services upon discharge.    Rehab Prognosis: Fair; patient would benefit from acute skilled PT services to address these deficits and reach maximum level of function.    Recent Surgery: * No surgery found *      Plan:     During this hospitalization, patient to be seen 5 x/week to address the identified rehab impairments via gait training, therapeutic activities, therapeutic exercises and progress toward the following goals:    · Plan of Care Expires:  06/25/19    Subjective     Chief Complaint: n/a  Patient/Family Comments/goals: pt's wife states she can handle her .  Pain/Comfort:  · Pain Rating 1: (no c/o's)      Objective:     Communicated with nsg prior to session.  Patient found up in chair with bed alarm, telemetry upon PT entry to room.     General Precautions: Standard, fall, hearing impaired   Orthopedic Precautions:N/A   Braces: N/A     Functional Mobility:  · Bed Mobility:     · Supine to Sit: moderate assistance  · Transfers:     · Sit to Stand:  moderate assistance with rolling walker  · Bed to Chair: moderate assistance with  rolling  walker  using  Step Transfer  · Gait: 2-3 festinating gait steps X 2 trials with ModA  · Balance: poor standing balance with RW      AM-PAC 6 CLICK MOBILITY  Turning over in bed (including adjusting bedclothes, sheets and blankets)?: 2  Sitting down on and standing up from a chair with arms (e.g., wheelchair, bedside commode, etc.): 2  Moving from lying on back to sitting on the side of the bed?: 2  Moving to and from a bed to a chair (including a wheelchair)?: 2  Need to walk in hospital room?: 1  Climbing 3-5 steps with a railing?: 1  Basic Mobility Total Score: 10       Therapeutic Activities and Exercises: pt with rest periods and one seated rest required.       Patient left up in chair with all lines intact, call button in reach, chair alarm on, nsg notified and family present..    GOALS: see general POC  Multidisciplinary Problems     Physical Therapy Goals        Problem: Physical Therapy Goal    Goal Priority Disciplines Outcome Goal Variances Interventions   Physical Therapy Goal     PT, PT/OT Ongoing (interventions implemented as appropriate)     Description:  Goals to be met by: 2019     Patient will increase functional independence with mobility by performin. Supine to sit with Stand-by Assistance  2. Sit to stand transfer with Stand-by Assistance  3. Bed to chair transfer with Contact Guard Assistance using Rolling Walker  4. Gait  x 15 feet with Minimal Assistance using Rolling Walker.   4. Lower extremity exercise program x12 reps per handout, with assistance as needed                      Time Tracking:     PT Received On: 19  PT Start Time: 0840     PT Stop Time: 0906  PT Total Time (min): 26 min     Billable Minutes: Gait Training 13 and Therapeutic Activity 13    Treatment Type: Treatment  PT/PTA: PTA     PTA Visit Number: 1     Davey Cline, PTA  2019

## 2019-05-26 NOTE — NURSING
Patient is being discharged home per MD.  Discharge instructions on medications and follow-up visits are given to the patient's family members and family members verbalized complete understanding on the above discharge instructions.  Instructed where to  medications.  Family members are at bedside.  Awaiting wheelchair to be delivered.  Will call for Transport when patient is ready to be discharged.

## 2019-05-26 NOTE — PLAN OF CARE
"  TN faxed HH/DME orders to [facility], [phone] fax [phone], awaiting return call from their on call to arrange HH and DME delivery     12:46 pm, TN received call from Celestine MURO. Pt previously with [facility] ,  nurse will contact patient to arrange a visit for Monday, 5/27/19. TN faxed DME orders, RW/BSC to [facility] [phone] per request of celestine. TN verified address for delivery to home of equipment as well as HH visits.      TN spoke with pt's daughter, Ynes who was bedside, they will deliver DME bedside prior to discharge     1:25 pm, TN received call from nurse, pt's family stating pt unable to walk need WC. MD placed DME order for 18" WC. TN spoke with pt's daughter to verify, they now want DME delivered to bedside prior to discharge. TN also explained to daughter that [facility] will not cover both a RW and WC, they prefer to get the WC. TN faxed new orders to [facility].  TN contacted [facility] to let them know as well [phone]. Will deliver to bedside prior to discharge    Future Appointments   Date Time Provider Department Center   5/27/2019  9:40 AM Corinna Roy MD [facility]       Pt's nurse will go over medications/signs and symptoms prior to discharge       05/26/19 1523   Final Note   Assessment Type Discharge Planning Assessment   Anticipated Discharge Disposition Home-Health  ([facility] )   What phone number can be called within the next 1-3 days to see how you are doing after discharge? [phone]  (dtr Ynes)   [facility] Follow Up  Appt(s) scheduled? No  (Offices closed for Weekend.  Patient to schedule own follow up appointment.)   Right Care Referral Info   Post Acute Recommendation Home-care     Shell Loredo, RN Transitional Navigator  [phone]    "

## 2019-05-26 NOTE — PROGRESS NOTES
[facility] Neurology Progress Note    Please see plan of care dated today 5/25/19.    Estrellita Cason MD  [facility] neurology II

## 2019-05-26 NOTE — PLAN OF CARE
Problem: Adult Inpatient Plan of Care  Goal: Plan of Care Review  Outcome: Ongoing (interventions implemented as appropriate)     05/26/19 9789   Plan of Care Review   Plan of Care Reviewed With daughter;patient   Oriented to self,delayed responses,denies pain,assessment ongoing,safety and comfort maintained.

## 2019-05-26 NOTE — PLAN OF CARE
Problem: Physical Therapy Goal  Goal: Physical Therapy Goal  Goals to be met by: 2019     Patient will increase functional independence with mobility by performin. Supine to sit with Stand-by Assistance  2. Sit to stand transfer with Stand-by Assistance  3. Bed to chair transfer with Contact Guard Assistance using Rolling Walker  4. Gait  x 15 feet with Minimal Assistance using Rolling Walker.   4. Lower extremity exercise program x12 reps per handout, with assistance as needed     Outcome: Ongoing (interventions implemented as appropriate)  Goals ongoing

## 2019-05-26 NOTE — PROGRESS NOTES
"  TN faxed HH/DME orders to [facility], [phone] fax [phone], awaiting return call from their on call to arrange HH and DME delivery    12:46 pm, TN received call from Celestine MURO. Pt previously with [facility], will try to see if they are able to accept him. TN faxed DME orders, RW/BSC to [facility] [phone] per request of celestine. TN verified address for delivery to home of equipment as well as HH visits. TN spoke with pt's daughter, Ynes who was bedside, they will call her at [phone] to arrange delivery of DME.    1:25 pm, TN received call from nurse, pt's family stating pt unable to walk need WC. MD placed DME order for 18" WC. TN spoke with pt's daughter to verify, they now want DME delivered to bedside prior to discharge. TN also explained to daughter that [facility] will not cover both a RW and WC, they prefer to get the WC. TN faxed new orders to [facility].  TN contacted [facility] to let them know as well [phone].  "

## 2019-05-26 NOTE — DISCHARGE SUMMARY
[facility] Hospital Medicine Discharge Summary    Primary Team: [facility] Hospitalist Team A  Attending Physician: Genesis Shoemaker MD  Resident: Floyd  Intern: Michael    Date of Admit: 5/24/2019  Date of Discharge: 5/26/2019    Discharge to: Home  Condition: Improved    Discharge Diagnoses     Patient Active Problem List   Diagnosis    Dementia    Gilbert's disease    Alzheimer's disease, unspecified    Acute encephalopathy    UTI (urinary tract infection)    Volume depletion    Physical deconditioning    Urinary tract infection without hematuria    Cerebral ventriculomegaly    Vitamin D deficiency     Consultants and Procedures     Consultants: Neuro, PT/OT    Procedures: None    Imaging:  CXR: No acute radiographic findings in the chest on this single view. Probable granuloma projecting over the right middle lung zone     CT head: No acute large vascular territory infarct or intracranial hemorrhage identified. Generalized cerebral volume loss and supratentorial white matter mild chronic small vessel ischemic change. Ventriculomegaly out of proportion to sulcal prominence, nonspecific but can be seen with normal pressure hydrocephalus    Brief History of Present Illness      Héctor Harvey is a 78yo male with PMH of advanced Alzheimer's dementia and Gilbert's disease     The patient was in their usual state of health (lives with wife; walking unassisted, independent with ADLs, often confused and significant short term memory loss) until 2 days PTP when wife noticed pt just seemed to be a little weaker than usual and was having difficulty walking, was unable to feed or shave himself, all of which he typically does on his own. Per wife and daughter at bedside, pt just wasn't acting quite like himself and getting progressively weaker. On day of admit, pt's son had to physically carry the pt to the ED.      Per wife, pt does have occasional urinary incontinence, but this has been going on for several months/years now,  no worse in the past few days than previously. Pt did not complain of any urinary sx, though per family, pt would likely not be able to say if he had sx. Denies saddle anesthesia or other numbness/tingling. No bowel incontinence. No recent F/C, N/V/D, or sick contacts.      Discharge Physical Exam     Vitals:    05/26/19 0820   BP: 110/62   Pulse: 74   Resp: 18   Temp: 97.2 °F (36.2 °C)     GEN: oriented to person only, NAD, laying comfortably in bed  HEENT: EOMI, MMM, throat without erythema or exudates  NECK: no thyromegaly or other masses  HEART: RRR, no murmurs or rubs  RESP: CTAB, normal work of breathing, no wheezes, crackles, or rhonchi  ABD: soft, NT, ND, +BS; no masses or HSM  EXT: no clubbing, cyanosis, or edema; 2+ DP/PT and radial pulses  NEURO: 3/5 strength in all 4 extremities; light touch sensation intact and symmetric  SKIN: warm and dry; no rashes    Hospital Course By Problem with Pertinent Findings     Acute encephalopathy on chronic dementia  - Pt with reported weakness and AMS x3 days per family  - chronic dementia at baseline  - VSS on presentation; SIRS 0; qSOFA 1  - lactate, CPK, TSH, and ammonia WNL  - UTI as below  - CT head without acute pathology; ventriculomegaly concerning for NPH   - CXR with probable in RML  - IVF resuscitation and abx as below  - neuro consulted > ok to discharge with neuro follow-up      E. Coli UTI  - UA +nitrites, 15 WBC, UCx w/ presumptive E.coli, susceptibilities pending  - Follow-up with PCP on Monday  - s/p Rocephin x2 then transitioned to PO Cipro. Will discharge with in ED > another dose while inpatient, then discharged on po cipro for 7 day course  - f/u cx with PCP on 5/27     Volume depletion  - pt mildly hypovolemic on exam    gentle IVF resuscitation      Weakness  - pt with reported subacute progression of weakness and inability to walk over 3 days PTA  - consulted PT/OT > Recommending HH, RW, Commode     Alzheimer's dementia  - continue home meds  (donepezil, memantine)     Gilbert's disease  - Alkphos 149, Tbili 2.1 (baseline alkphos 117-127 and Tbili 1.9-2.2 in 2017)  - pt at baseline w/known hx Gilbert's     Vitamin D deficiency  - home med: cholecalciferol > continue  - Vit D level low (24)      HCM  - A1c, TSH, B12, lipid panel WNL  - Tdap prior to discharge    Discharge Medications      Héctor Harvey   Home Medication Instructions GONZALO:[id number]    Printed on:05/25/19 8202   Medication Information                      cholecalciferol, vitamin D3, 1,000 unit capsule  Take 1 capsule (1,000 Units total) by mouth once daily.     ciprofloxacin HCl (CIPRO) 500 MG tablet  Take 1 tablet (500 mg total) by mouth 2 (two) times daily. for 7 days     docusate sodium (COLACE) 100 MG capsule  Take 1 capsule (100 mg total) by mouth 2 (two) times daily.     donepezil (ARICEPT) 10 MG tablet  Take 1 tablet (10 mg total) by mouth once daily.     memantine (NAMENDA) 10 MG Tab  Take 1 tablet (10 mg total) by mouth 2 (two) times daily.         Discharge Information:   Diet: Dental soft, thin liquids    Physical Activity: As tolerated             Instructions:  1. Take all medications as prescribed  2. Keep all follow-up appointments  3. Return to the hospital or call your primary care physicians if any worsening symptoms such as fever, chest pain, shortness of breath, return of symptoms, or any other concerns.    Follow-Up Appointments: PCP, Neuro    Lesli Collins MD  [facility] Internal Medicine, [place]

## 2019-05-27 ENCOUNTER — OFFICE VISIT (OUTPATIENT)
Dept: FAMILY MEDICINE | Facility: HOSPITAL | Age: 79
End: 2019-05-27
Attending: FAMILY MEDICINE
Payer: MEDICARE

## 2019-05-27 VITALS
SYSTOLIC BLOOD PRESSURE: 106 MMHG | WEIGHT: 97.44 LBS | HEIGHT: 65 IN | HEART RATE: 88 BPM | BODY MASS INDEX: 16.24 KG/M2 | DIASTOLIC BLOOD PRESSURE: 69 MMHG

## 2019-05-27 DIAGNOSIS — N30.00 ACUTE CYSTITIS WITHOUT HEMATURIA: ICD-10-CM

## 2019-05-27 DIAGNOSIS — F02.80 LATE ONSET ALZHEIMER'S DISEASE WITHOUT BEHAVIORAL DISTURBANCE: ICD-10-CM

## 2019-05-27 DIAGNOSIS — R41.82 ALTERED MENTAL STATUS, UNSPECIFIED ALTERED MENTAL STATUS TYPE: Primary | ICD-10-CM

## 2019-05-27 DIAGNOSIS — G30.1 LATE ONSET ALZHEIMER'S DISEASE WITHOUT BEHAVIORAL DISTURBANCE: ICD-10-CM

## 2019-05-27 DIAGNOSIS — R53.81 PHYSICAL DECONDITIONING: ICD-10-CM

## 2019-05-27 LAB — BACTERIA UR CULT: NORMAL

## 2019-05-27 PROCEDURE — 99214 OFFICE O/P EST MOD 30 MIN: CPT | Performed by: FAMILY MEDICINE

## 2019-05-27 RX ORDER — DONEPEZIL HYDROCHLORIDE 10 MG/1
10 TABLET, FILM COATED ORAL DAILY
Qty: 90 TABLET | Refills: 2 | Status: SHIPPED | OUTPATIENT
Start: 2019-05-27

## 2019-05-27 NOTE — PROGRESS NOTES
Subjective:       Patient ID: Héctor Harvey is a 79 y.o. male.    Chief Complaint: Hospital Follow Up and Urinary Tract Infection    HPI Pt is 80 yo M w/ PMHx of Alzheimer's dementia, Gilbert's Disease who presents for follow-up after hospitalization for Acute Encephalopathy on chronic dementia thought to be 2/2 UTI. He is accompanied to visit by his two daughters who provide most of history. Prior to hospitalization family notes that patient has had a slow progressive decline over last few weeks. Per daughters, he is now unable to feed himself and has shuffling gait. He also has increased bowel and urinary incontinence. And has been using wheelchair since discharge yesterday.    During hospitalization, he was treated for UTI. Family notes some improvement in cognition since beginning antibiotic therapy with Ciprofloaxin however is not at his baseline. He was seen by Neurology during hospital stay who advised MRI brain to assess if underlying process as NPH is a diagnosis of exclusion. Neurology referral was placed for outpatient follow-up.     Per family PT/OT recommended LTAC during admission however patient's wife preferred him to go home with home health PT. Today family requests rehab. Says patient is too much for wife to handle alone.      Daughter (Ynes) is POA.     Review of Systems   Constitutional: Positive for fatigue.   Respiratory: Negative for cough and shortness of breath.    Cardiovascular: Negative for chest pain.   Gastrointestinal: Negative for abdominal pain.   Skin: Negative for rash.       Objective:      Vitals:    05/27/19 0919   BP: 106/69   Pulse: 88     Physical Exam   Constitutional: He appears well-developed and well-nourished.   Appears somewhat tired and fatigued. In wheelchair   HENT:   Head: Normocephalic and atraumatic.   Cardiovascular: Normal rate, regular rhythm and normal heart sounds.   Pulmonary/Chest: Effort normal and breath sounds normal.   Musculoskeletal:        Neurological: He is alert.   Cooperative. Aware of person and daughters in room. Unaware of situation. Able to answer some basic questions. 3/5 strength upper and lower extremities b/l     Skin: Skin is warm and dry.   Psychiatric: His behavior is normal. Judgment and thought content normal.       Assessment:       1. Altered mental status, unspecified altered mental status type    2. Acute cystitis without hematuria    3. Late onset Alzheimer's disease without behavioral disturbance    4. Physical deconditioning        Plan:       Altered mental status, unspecified altered mental status type        -     Alzheimer dementia at baseline        -     Recent discharge after hospitalization for acute encephalopathy possibly 2/2 UTI.         -     Some improvement in mentation, not yet at baseline        -     Neuro with recommendation for MRI and neuro f/u outpatient to eval for NPH after large ventricles seen on CT head  -     MRI Brain Without Contrast; Future; Expected date: 05/27/2019  -     Ambulatory Referral to Physical/Occupational Therapy  -     Family would like patient admitted to inpatient rehab  -     Discussed with outpatient rehab. Will see if placement can be facilitated    Acute Cystitis without hematuria        -    Currently being treated for UTI        -     Urine cx with E.coli, pansensitive.         -     Continue Ciprofloxacin    Late Onset Alzheimer's Disease        -    Continue Donepezil and Memantine       -    F/U with Neuro this week    Physical Deconditioning       -     Recent decline exacerbated after hospitalization       -     Ambulatory Referral to Physical/Occupational Therapy       -     Family would like patient admitted to inpatient rehab       -     Discussed with outpatient rehab. Will see if placement can be facilitated    Follow up in about 6 weeks (around 7/8/2019).     Corinna Roy MD  5/27/2019  [facility] Family Medicine HO-3

## 2019-05-28 ENCOUNTER — TELEPHONE (OUTPATIENT)
Dept: FAMILY MEDICINE | Facility: HOSPITAL | Age: 79
End: 2019-05-28

## 2019-05-28 NOTE — TELEPHONE ENCOUNTER
----- Message from Estrellita Nichols MA sent at 5/28/2019  3:53 PM CDT -----  Contact: Sabina; [facility]  [phone]   Please call Sabina to discuss; wants patient to have mri before he starts rehab.  Please call to discuss further.  Thanks.

## 2019-05-30 ENCOUNTER — HOSPITAL ENCOUNTER (OUTPATIENT)
Dept: RADIOLOGY | Facility: HOSPITAL | Age: 79
Discharge: HOME | End: 2019-05-30
Attending: FAMILY MEDICINE
Payer: MEDICARE

## 2019-05-30 DIAGNOSIS — R41.82 ALTERED MENTAL STATUS, UNSPECIFIED ALTERED MENTAL STATUS TYPE: ICD-10-CM

## 2019-05-30 PROCEDURE — 70551 MRI BRAIN STEM W/O DYE: CPT | Mod: TC

## 2019-05-30 PROCEDURE — 70551 MRI BRAIN WITHOUT CONTRAST: ICD-10-PCS | Mod: 26,,, | Performed by: RADIOLOGY

## 2019-05-30 PROCEDURE — 70551 MRI BRAIN STEM W/O DYE: CPT | Mod: 26,,, | Performed by: RADIOLOGY

## 2019-05-31 ENCOUNTER — OFFICE VISIT (OUTPATIENT)
Dept: NEUROLOGY | Facility: CLINIC | Age: 79
End: 2019-05-31
Payer: MEDICARE

## 2019-05-31 VITALS
SYSTOLIC BLOOD PRESSURE: 95 MMHG | DIASTOLIC BLOOD PRESSURE: 68 MMHG | HEART RATE: 74 BPM | WEIGHT: 96.38 LBS | BODY MASS INDEX: 16.06 KG/M2 | HEIGHT: 65 IN

## 2019-05-31 DIAGNOSIS — R53.81 PHYSICAL DECONDITIONING: ICD-10-CM

## 2019-05-31 DIAGNOSIS — N30.00 ACUTE CYSTITIS WITHOUT HEMATURIA: ICD-10-CM

## 2019-05-31 DIAGNOSIS — G30.1 LATE ONSET ALZHEIMER'S DISEASE WITHOUT BEHAVIORAL DISTURBANCE: Primary | ICD-10-CM

## 2019-05-31 DIAGNOSIS — E80.4 GILBERT'S DISEASE: ICD-10-CM

## 2019-05-31 DIAGNOSIS — F02.80 LATE ONSET ALZHEIMER'S DISEASE WITHOUT BEHAVIORAL DISTURBANCE: Primary | ICD-10-CM

## 2019-05-31 PROCEDURE — 99999 PR PBB SHADOW E&M-EST. PATIENT-LVL III: CPT | Mod: PBBFAC,,, | Performed by: PSYCHIATRY & NEUROLOGY

## 2019-05-31 PROCEDURE — 99204 PR OFFICE/OUTPT VISIT, NEW, LEVL IV, 45-59 MIN: ICD-10-PCS | Mod: S$GLB,,, | Performed by: PSYCHIATRY & NEUROLOGY

## 2019-05-31 PROCEDURE — 1101F PR PT FALLS ASSESS DOC 0-1 FALLS W/OUT INJ PAST YR: ICD-10-PCS | Mod: CPTII,S$GLB,, | Performed by: PSYCHIATRY & NEUROLOGY

## 2019-05-31 PROCEDURE — 99204 OFFICE O/P NEW MOD 45 MIN: CPT | Mod: S$GLB,,, | Performed by: PSYCHIATRY & NEUROLOGY

## 2019-05-31 PROCEDURE — 99999 PR PBB SHADOW E&M-EST. PATIENT-LVL III: ICD-10-PCS | Mod: PBBFAC,,, | Performed by: PSYCHIATRY & NEUROLOGY

## 2019-05-31 PROCEDURE — 1101F PT FALLS ASSESS-DOCD LE1/YR: CPT | Mod: CPTII,S$GLB,, | Performed by: PSYCHIATRY & NEUROLOGY

## 2019-05-31 NOTE — PROGRESS NOTES
Subjective:       Patient ID: Héctor Harvey is a 79 y.o. male.    Chief Complaint:  Memory Loss      History of Present Illness  HPI   This is a 79-year-old male who was referred for evaluation of cognitive difficulties.  He has been diagnosed as having Alzheimer's disease and has been on Aricept 10 mg daily and Namenda 10 mg twice a day.  His daughter was present today and was the primary historian as the patient could not give any adequate history.  She reported that he was 1st diagnosed as a dementia several years ago by Neurology, Dr. Roque at [facility].  He had a complete workup done including memory testing and was subsequently started Aricept and Namenda.  He was subsequently followed by [facility] primary care in [place] and medications were continued.  He is presently being followed by PCP at [facility].  His daughter reports that it has been a gradual decline in his cognitive abilities.  There had to take away his car keys as he was having problems driving about 4 years ago.  He now has to be cued as to when to take a bath and what to wear.  He lives with the spouse was primary care giver however his daughters do help out.  His spouse manages his medications and he requires directions as to what to do including eating his meals however his appetite has gradually declined and he had lost lot weight over the past few months.  Sleep is otherwise good, and no behavior problems are reported.  His daughter reports that he has been a  all of his life.  He has had no history of head trauma has not significantly hearing impaired.    He was recently admitted to the hospital with a 2-3 day history of increasing confusion and gait instability.  He had also had urinary incontinence a few days prior to that admission.  A CT scan of the head done showed no acute large vascular territory infarct or intracranial hemorrhage identified. Generalized cerebral volume loss and supratentorial white matter mild chronic small vessel  ischemic change. Ventriculomegaly out of proportion to sulcal prominence, nonspecific but can be seen with normal pressure hydrocephalus.  [facility] was contacted by phone and they reviewed the CT scan which was felt to be nonspecific however advised to get an MRI scan done as an outpatient.  Workup done during that hospitalization revealed evidence UTI for which she was treated with antibiotics.  He was also noted to be mildly hypovolemic on examination.  On further reviewing the history was noted that the patient has had intermittent problems incontinence for a while with cysts that an exacerbation of his symptoms including confusion just a few days prior to admission to the hospital on 05/24/2019.  He had improved by the time he was discharged from the hospital.  The MRI scan of the brain was subsequently done on 05/30/2019 as an outpatient.  This showed prominent of the ventricular system which is slightly out of proportion to the degree of sulcal enlargement, findings that may relate to predominantly central involutional changes or normal pressure hydrocephalus.  Mild chronic microvascular ischemic changes.  No acute infarction.  Right posterior temporal and occipital remote microhemorrhages.    Since discharge from the hospital the patient has been generally weak but has slowly been improving.  He now uses a walker to ambulate and is responding to PT/OT.  His initial confusion has slowly been improving to his usual baseline.  He has had no falls.       Review of Systems  Review of Systems   Constitutional: Negative.    HENT: Negative.  Negative for hearing loss.    Eyes: Negative.  Negative for visual disturbance.   Respiratory: Negative.  Negative for shortness of breath.    Cardiovascular: Negative.  Negative for chest pain and palpitations.   Gastrointestinal: Negative.    Endocrine: Negative.    Genitourinary: Negative.    Musculoskeletal: Positive for gait problem. Negative for back pain.   Skin:  Negative.    Allergic/Immunologic: Negative.    Neurological: Positive for weakness. Negative for dizziness, tremors, seizures, syncope, speech difficulty, numbness and headaches.   Hematological: Negative.    Psychiatric/Behavioral: Positive for confusion. Negative for decreased concentration and sleep disturbance.       Objective:      Neurologic Exam     Mental Status   Oriented to person.   Disoriented to place.   Disoriented to time.   Registration: recalls 3 of 3 objects. Recall of objects at 5 minutes: 0/3. Follows 3 step commands.   Attention: normal. Concentration: normal.   Speech: speech is normal   Level of consciousness: alert  Knowledge: good.   Able to name object. Able to read. Able to repeat. Able to write. Normal comprehension.   Cannot spell forwards or backwards.  Thinking is concrete.  Decreased verbal output with patient able to give very little information.  He cannot give any adequate history.     Cranial Nerves   Cranial nerves II through XII intact.     CN II   Visual acuity: (Limited examination because of patient's cognitive status)    CN III, IV, VI   Pupils are equal, round, and reactive to light.  Extraocular motions are normal.   Right pupil: Size: 3 mm. Shape: regular. Reactivity: brisk. Consensual response: intact. Accommodation: intact.   Left pupil: Size: 3 mm. Shape: regular. Reactivity: brisk. Consensual response: intact. Accommodation: intact.   CN III: no CN III palsy  CN VI: no CN VI palsy  Nystagmus: none   Diplopia: none  Ophthalmoparesis: none    CN V   Facial sensation intact.     CN VII   Facial expression full, symmetric.     CN VIII   CN VIII normal.     CN IX, X   CN IX normal.     CN XI   CN XI normal.     CN XII   CN XII normal.   Fundus examination:  Sharp disc margins.  Normal vessels on nondilated exam.     Motor Exam   Muscle bulk: normal  Overall muscle tone: normal    Strength   Strength 5/5 except as noted. Moved all 4 extremities symmetrically. No pronator  drift.   are good. Some clumsiness of fine motor movements bilaterally     Sensory Exam   Light touch normal.   Pinprick normal.   Limited examination because patient's cognitive difficulties.     Gait, Coordination, and Reflexes     Gait  Gait: non-neurologic (Patient is generally weak.  He can ambulate slowly with assistance only and is unstable)    Coordination   Finger to nose coordination: normal    Tremor   Resting tremor: absent  Intention tremor: absent  Action tremor: absent    Reflexes   Right brachioradialis: 1+  Left brachioradialis: 1+  Right biceps: 1+  Left biceps: 1+  Right triceps: 1+  Left triceps: 1+  Right patellar: 1+  Left patellar: 1+  Right achilles: 1+  Left achilles: 1+  Right plantar: normal  Left plantar: normal      Physical Exam   Constitutional:   Lean, somewhat emaciated male, who is otherwise in no distress   HENT:   Head: Normocephalic and atraumatic.   Eyes: Pupils are equal, round, and reactive to light. EOM are normal.   Neck: Normal range of motion. Neck supple. Carotid bruit is not present.   Neurological: He has a normal Finger-Nose-Finger Test.   Reflex Scores:       Tricep reflexes are 1+ on the right side and 1+ on the left side.       Bicep reflexes are 1+ on the right side and 1+ on the left side.       Brachioradialis reflexes are 1+ on the right side and 1+ on the left side.       Patellar reflexes are 1+ on the right side and 1+ on the left side.       Achilles reflexes are 1+ on the right side and 1+ on the left side.  Psychiatric: His speech is normal.   Vitals reviewed.        Assessment:        1. Late onset Alzheimer's disease without behavioral disturbance    2. Gilbert's disease    3. Physical deconditioning    4. Acute cystitis without hematuria            Plan:       Discussed with family and daughter. His sudden change in gait instability and confusion is most likely related to UTI as normal pressure hydrocephalus is not an acute illness.  The  ventriculomegaly may be related to his advanced Alzheimer's disease as the patient has significant cognitive impairment.  Continue with present PT/OT via home health services.  Discussed nutritional status with patient's daughter specially with increasing calorie intake and fluids as well as OTC sublingual B12 supplementation.  Continue with Aricept and Namenda as has been prescribed by his primary care physician.  Fall precautions discussed. Will follow up in 3 months.

## 2019-05-31 NOTE — PATIENT INSTRUCTIONS
Discussed with family and daughter. His sudden change in gait instability and confusion is most likely related to UTI as normal pressure hydrocephalus is not an acute illness.  The ventriculomegaly may be related to his advanced Alzheimer's disease as the patient has significant cognitive impairment.  Continue with present PT/OT via home health services.  Discussed nutritional status with patient's daughter specially with increasing calorie intake and fluids as well as OTC sublingual B12 supplementation.  Continue with Aricept and Namenda as has been prescribed by his primary care physician.  Fall precautions discussed. Will follow up in 3 months.

## 2019-06-20 ENCOUNTER — TELEPHONE (OUTPATIENT)
Dept: FAMILY MEDICINE | Facility: HOSPITAL | Age: 79
End: 2019-06-20

## 2019-06-20 NOTE — TELEPHONE ENCOUNTER
Called and spoke with daughter of patient. Her sister is worried patient might have another UTI. She is going to check on the patient and call in the morning if patient needs to be seen.

## 2019-06-20 NOTE — TELEPHONE ENCOUNTER
----- Message from Cristine Dangelo sent at 6/20/2019  8:19 AM CDT -----  PT DAUGHTER AMADO [phone] NEED DR VILLALOBOS TO PLEASE GIVE HER A CALL ON BEHALF OF HER DAD

## 2019-06-21 ENCOUNTER — OFFICE VISIT (OUTPATIENT)
Dept: FAMILY MEDICINE | Facility: HOSPITAL | Age: 79
End: 2019-06-21
Attending: FAMILY MEDICINE
Payer: MEDICARE

## 2019-06-21 VITALS
BODY MASS INDEX: 15.8 KG/M2 | WEIGHT: 94.81 LBS | HEIGHT: 65 IN | HEART RATE: 70 BPM | SYSTOLIC BLOOD PRESSURE: 100 MMHG | DIASTOLIC BLOOD PRESSURE: 69 MMHG

## 2019-06-21 DIAGNOSIS — N30.00 ACUTE CYSTITIS WITHOUT HEMATURIA: Primary | ICD-10-CM

## 2019-06-21 PROCEDURE — 99213 OFFICE O/P EST LOW 20 MIN: CPT | Performed by: STUDENT IN AN ORGANIZED HEALTH CARE EDUCATION/TRAINING PROGRAM

## 2019-06-21 RX ORDER — SULFAMETHOXAZOLE AND TRIMETHOPRIM 800; 160 MG/1; MG/1
1 TABLET ORAL 2 TIMES DAILY
Qty: 28 TABLET | Refills: 0 | Status: SHIPPED | OUTPATIENT
Start: 2019-06-21 | End: 2019-07-05

## 2019-06-21 RX ORDER — MULTIVIT WITH MINERALS/HERBS
1 TABLET ORAL DAILY
COMMUNITY

## 2019-06-23 NOTE — PROGRESS NOTES
Subjective                                                                                                                                                                           Chief Complaint: UTI follow up    History of Present Illness  Héctor Harvey is a 79 y.o. male who presents to clinic for urgent evaluation of possible UTI. The patient was in their usual state of health until 2 days ago when daughter noticed pt just seemed to be a little weaker than usual and seemed a little bit more confused, although patient does have dementia at baseline. Per wife and daughter at bedside, pt just wasn't acting quite like himself and getting progressively weaker. Pt does not complain of any urinary sx, though per family, pt would likely not be able to say if he had sx given level of dementia. Of note, patient with similar presentation which resulted in admission to hospital last month.     Review of Systems   Unable to perform ROS: Dementia        Past Medical History:   Diagnosis Date    Late onset Alzheimer's disease without behavioral disturbance 10/20/2016    Physical deconditioning 5/24/2019     Past Surgical History:   Procedure Laterality Date    COLONOSCOPY  2011     Review of patient's allergies indicates:  No Known Allergies      Current Outpatient Medications:     b complex vitamins tablet, Take 1 tablet by mouth once daily., Disp: , Rfl:     cholecalciferol, vitamin D3, 1,000 unit capsule, Take 1 capsule (1,000 Units total) by mouth once daily., Disp: 30 capsule, Rfl: 6    docusate sodium (COLACE) 100 MG capsule, Take 1 capsule (100 mg total) by mouth 2 (two) times daily., Disp: 180 capsule, Rfl: 2    donepezil (ARICEPT) 10 MG tablet, Take 1 tablet (10 mg total) by mouth once daily., Disp: 90 tablet, Rfl: 2    memantine (NAMENDA) 10 MG Tab, Take 1 tablet (10 mg total) by mouth 2 (two) times daily., Disp: 180 tablet, Rfl: 6    sulfamethoxazole-trimethoprim 800-160mg (BACTRIM DS) 800-160 mg Tab,  Take 1 tablet by mouth 2 (two) times daily. for 14 days, Disp: 28 tablet, Rfl: 0     Social History     Tobacco Use    Smoking status: Never Smoker   Substance Use Topics    Alcohol use: No    Drug use: Not on file        Objective                                                                                                                                                                             Vitals:    06/21/19 1004   BP: 100/69   Pulse: 70       Body mass index is 15.78 kg/m².    Physical Exam   Constitutional: He appears well-developed and well-nourished. No distress.   Sitting in wheelchair   HENT:   Head: Normocephalic and atraumatic.   Eyes: Conjunctivae and EOM are normal.   Neck: Normal range of motion. Neck supple.   Cardiovascular: Normal rate and regular rhythm.   Pulmonary/Chest: Effort normal and breath sounds normal.   Abdominal: Soft. Bowel sounds are normal. He exhibits no distension. There is no tenderness.   Musculoskeletal: He exhibits no edema or tenderness.   Neurological: He is alert.   Skin: Skin is warm.   Psychiatric: He has a normal mood and affect. His behavior is normal.   Nursing note and vitals reviewed.       Laboratory:  Lab Results   Component Value Date    WBC 11.88 05/25/2019    HGB 14.3 05/25/2019    HCT 43.3 05/25/2019    MCV 94 05/25/2019     05/25/2019       Chemistry        Component Value Date/Time     05/25/2019 0815    K 4.3 05/25/2019 0815     05/25/2019 0815    CO2 25 05/25/2019 0815    BUN 14 05/25/2019 0815    CREATININE 0.7 05/25/2019 0815    GLU 85 05/25/2019 0815        Component Value Date/Time    CALCIUM 9.2 05/25/2019 0815    ALKPHOS 130 05/25/2019 0815    AST 12 05/25/2019 0815    ALT <5 (L) 05/25/2019 0815    BILITOT 2.9 (H) 05/25/2019 0815    ESTGFRAFRICA >60 05/25/2019 0815    EGFRNONAA >60 05/25/2019 0815          Lab Results   Component Value Date    MG 2.2 03/02/2018     Lab Results   Component Value Date    CHOL 143 05/24/2019     HDL 43 05/24/2019    LDLCALC 88.2 05/24/2019    TRIG 59 05/24/2019     Lab Results   Component Value Date    TSH 2.564 05/24/2019     Lab Results   Component Value Date    HGBA1C 5.2 05/24/2019     .    Assessment/Plan                                                                                                                                                                Héctor Harvey is a 79 y.o. male who presents to clinic with:    1. Acute cystitis without hematuria         Problem List Items Addressed This Visit        Renal/    UTI (urinary tract infection) - Primary    Relevant Medications    sulfamethoxazole-trimethoprim 800-160mg (BACTRIM DS) 800-160 mg Tab          Medication List with Changes/Refills   New Medications    SULFAMETHOXAZOLE-TRIMETHOPRIM 800-160MG (BACTRIM DS) 800-160 MG TAB    Take 1 tablet by mouth 2 (two) times daily. for 14 days   Current Medications    B COMPLEX VITAMINS TABLET    Take 1 tablet by mouth once daily.    CHOLECALCIFEROL, VITAMIN D3, 1,000 UNIT CAPSULE    Take 1 capsule (1,000 Units total) by mouth once daily.    DOCUSATE SODIUM (COLACE) 100 MG CAPSULE    Take 1 capsule (100 mg total) by mouth 2 (two) times daily.    DONEPEZIL (ARICEPT) 10 MG TABLET    Take 1 tablet (10 mg total) by mouth once daily.    MEMANTINE (NAMENDA) 10 MG TAB    Take 1 tablet (10 mg total) by mouth 2 (two) times daily.       The patient's diagnosis, medications, and proper use of medications were dicussed. The importance of close follow up to discuss labs, modify medications, and monitor any potential side effects was also discussed.    Follow up if symptoms worsen or fail to improve. Follow up for further workup and reassessment or sooner as needed. Instructed family to present to ED if symptoms worsen      Patient expressed understanding after counseling regarding diagnosis and recommendations.        Juancarlos Lowe MD  [facility] PGY-2

## 2019-07-23 ENCOUNTER — OFFICE VISIT (OUTPATIENT)
Dept: FAMILY MEDICINE | Facility: HOSPITAL | Age: 79
End: 2019-07-23
Attending: SPECIALIST
Payer: MEDICARE

## 2019-07-23 VITALS
DIASTOLIC BLOOD PRESSURE: 71 MMHG | HEIGHT: 65 IN | SYSTOLIC BLOOD PRESSURE: 111 MMHG | HEART RATE: 72 BPM | BODY MASS INDEX: 15.94 KG/M2 | WEIGHT: 95.69 LBS

## 2019-07-23 DIAGNOSIS — R60.0 BILATERAL LOWER EXTREMITY EDEMA: Primary | ICD-10-CM

## 2019-07-23 PROCEDURE — 99213 OFFICE O/P EST LOW 20 MIN: CPT | Performed by: STUDENT IN AN ORGANIZED HEALTH CARE EDUCATION/TRAINING PROGRAM

## 2019-07-23 NOTE — PROGRESS NOTES
Subjective:       Patient ID: Héctor Harvey is a 79 y.o. male.    Chief Complaint: Foot Swelling    Patient is a 80 yo male pmhx of Alzheimer's dementia presenting with family for urgent care appt.  Patient's family report they noticed bilateral lower extremity edema starting this past Sunday.  Swelling improves in the AM.  Denies history of heart problems.  Patient was recently discharged from home health, home PT/OT.  Family reports he has not been moving around since discharge.  Denies difficulty breathing.    Review of Systems   Constitutional: Negative for appetite change and fever.   HENT: Negative for congestion and sore throat.    Respiratory: Negative for cough, chest tightness and shortness of breath.    Cardiovascular: Positive for leg swelling. Negative for chest pain and palpitations.   Gastrointestinal: Negative for abdominal pain, diarrhea and nausea.   Musculoskeletal: Negative for neck pain and neck stiffness.   Skin: Negative for wound.   Neurological: Negative for dizziness, weakness and headaches.       Objective:      Vitals:    07/23/19 1043   BP: 111/71   Pulse: 72     Physical Exam   Constitutional: He appears well-developed and well-nourished.   HENT:   Head: Normocephalic and atraumatic.   Mouth/Throat: No oropharyngeal exudate.   Eyes: Pupils are equal, round, and reactive to light. EOM are normal.   Neck: Normal range of motion.   Cardiovascular: Normal rate, regular rhythm and normal heart sounds.   No murmur heard.  Pulmonary/Chest: Effort normal. No respiratory distress.   Abdominal: Soft. He exhibits no distension.   Musculoskeletal: Normal range of motion. He exhibits edema (1+ non-pitting edema of bilateral feet).   Neurological: He is alert. No cranial nerve deficit. He exhibits normal muscle tone. Coordination normal.   Skin: Skin is warm and dry. He is not diaphoretic.   Nursing note and vitals reviewed.      Assessment:       1. Bilateral lower extremity edema        Plan:        Bilateral lower extremity edema  -     COMPRESSION STOCKINGS    Will follow-up in 1 month to monitor for improvement.

## 2019-08-27 ENCOUNTER — OFFICE VISIT (OUTPATIENT)
Dept: NEUROLOGY | Facility: CLINIC | Age: 79
End: 2019-08-27
Payer: MEDICARE

## 2019-08-27 VITALS
DIASTOLIC BLOOD PRESSURE: 66 MMHG | HEART RATE: 62 BPM | WEIGHT: 89.5 LBS | HEIGHT: 65 IN | BODY MASS INDEX: 14.91 KG/M2 | SYSTOLIC BLOOD PRESSURE: 110 MMHG

## 2019-08-27 DIAGNOSIS — F03.90 DEMENTIA WITHOUT BEHAVIORAL DISTURBANCE, UNSPECIFIED DEMENTIA TYPE: ICD-10-CM

## 2019-08-27 DIAGNOSIS — R26.81 UNSTEADY GAIT: Primary | ICD-10-CM

## 2019-08-27 PROCEDURE — 1101F PR PT FALLS ASSESS DOC 0-1 FALLS W/OUT INJ PAST YR: ICD-10-PCS | Mod: CPTII,S$GLB,, | Performed by: PSYCHIATRY & NEUROLOGY

## 2019-08-27 PROCEDURE — 99999 PR PBB SHADOW E&M-EST. PATIENT-LVL III: CPT | Mod: PBBFAC,,, | Performed by: PSYCHIATRY & NEUROLOGY

## 2019-08-27 PROCEDURE — 99999 PR PBB SHADOW E&M-EST. PATIENT-LVL III: ICD-10-PCS | Mod: PBBFAC,,, | Performed by: PSYCHIATRY & NEUROLOGY

## 2019-08-27 PROCEDURE — 1101F PT FALLS ASSESS-DOCD LE1/YR: CPT | Mod: CPTII,S$GLB,, | Performed by: PSYCHIATRY & NEUROLOGY

## 2019-08-27 PROCEDURE — 99213 PR OFFICE/OUTPT VISIT, EST, LEVL III, 20-29 MIN: ICD-10-PCS | Mod: S$GLB,,, | Performed by: PSYCHIATRY & NEUROLOGY

## 2019-08-27 PROCEDURE — 99213 OFFICE O/P EST LOW 20 MIN: CPT | Mod: S$GLB,,, | Performed by: PSYCHIATRY & NEUROLOGY

## 2019-08-27 NOTE — LETTER
August 28, 2019      Daniel Jacobo MD  [address]  [address]  [facility] [address]           [facility]  [address], [address]  [facility] [address]  Phone: [phone]  Fax: [phone]          Patient: Héctor Harvey   MR Number: [id number]   YOB: 1940   Date of Visit: 8/27/2019       Dear Dr. Daniel Jacobo:    Thank you for referring Héctor Harvey to me for evaluation. Attached you will find relevant portions of my assessment and plan of care.    If you have questions, please do not hesitate to call me. I look forward to following Héctor Harvey along with you.    Sincerely,    Jesus Lopez MD    Enclosure  CC:  No Recipients    If you would like to receive this communication electronically, please contact [email or web address] or [phone] to request more information on [employer] Link access.    For providers and/or their staff who would like to refer a patient to [facility], please contact us through our one-stop-shop provider referral line, [facility], at [phone].    If you feel you have received this communication in error or would no longer like to receive these types of communications, please e-mail [email or web address]

## 2019-08-29 ENCOUNTER — TELEPHONE (OUTPATIENT)
Dept: NEUROLOGY | Facility: CLINIC | Age: 79
End: 2019-08-29

## 2019-08-29 NOTE — PROGRESS NOTES
"Neurology Clinic Visit  Primary Care Provider: Corinna Roy MD   Referring Provider: Daniel Jacobo MD   Date of Visit: 08/27/2019     chief complaint:   Chief Complaint   Patient presents with    Consult     Dr. Odalis lance patient       History of Present Illness  Héctor Harvey is a 79 y.o.male I have been asked to establish care with patient after the passing of his neurologist Dr. Jacobo. He is accompanied by his daughter who provides most the the history as well as chart review. She reports there has been no recent change since the last visit with Dr. Jacobo in 5/2019 although she was not present during that visit. The patient needs assistance with most other his ADLs. He ambulates at home without a walker but usually needs to hold on to something or someone when he walks. The daughter reports his walking deteriorated about April or May 2019 and at the same time he started having urinary incontinence. He is on aricept 10mg and namenda 10mg bid for his dementia. The daughter does not think he needs refills at the moment.        Per Dr. Jacobo's note 5/31/2019  "This is a 79-year-old male who was referred for evaluation of cognitive difficulties.  He has been diagnosed as having Alzheimer's disease and has been on Aricept 10 mg daily and Namenda 10 mg twice a day.  His daughter was present today and was the primary historian as the patient could not give any adequate history.  She reported that he was 1st diagnosed as a dementia several years ago by Neurology, Dr. Roque at [facility].  He had a complete workup done including memory testing and was subsequently started Aricept and Namenda.  He was subsequently followed by [facility] primary care in [place] and medications were continued.  He is presently being followed by PCP at [facility].  His daughter reports that it has been a gradual decline in his cognitive abilities.  There had to take away his car keys as he was having problems driving about 4 " years ago.  He now has to be cued as to when to take a bath and what to wear.  He lives with the spouse was primary care giver however his daughters do help out.  His spouse manages his medications and he requires directions as to what to do including eating his meals however his appetite has gradually declined and he had lost lot weight over the past few months.  Sleep is otherwise good, and no behavior problems are reported.  His daughter reports that he has been a  all of his life.  He has had no history of head trauma has not significantly hearing impaired.     He was recently admitted to the hospital with a 2-3 day history of increasing confusion and gait instability.  He had also had urinary incontinence a few days prior to that admission.  A CT scan of the head done showed no acute large vascular territory infarct or intracranial hemorrhage identified. Generalized cerebral volume loss and supratentorial white matter mild chronic small vessel ischemic change. Ventriculomegaly out of proportion to sulcal prominence, nonspecific but can be seen with normal pressure hydrocephalus.  [facility] was contacted by phone and they reviewed the CT scan which was felt to be nonspecific however advised to get an MRI scan done as an outpatient.  Workup done during that hospitalization revealed evidence UTI for which she was treated with antibiotics.  He was also noted to be mildly hypovolemic on examination.  On further reviewing the history was noted that the patient has had intermittent problems incontinence for a while with cysts that an exacerbation of his symptoms including confusion just a few days prior to admission to the hospital on 05/24/2019.  He had improved by the time he was discharged from the hospital.  The MRI scan of the brain was subsequently done on 05/30/2019 as an outpatient.  This showed prominent of the ventricular system which is slightly out of proportion to the degree of sulcal  "enlargement, findings that may relate to predominantly central involutional changes or normal pressure hydrocephalus.  Mild chronic microvascular ischemic changes.  No acute infarction.  Right posterior temporal and occipital remote microhemorrhages.     Since discharge from the hospital the patient has been generally weak but has slowly been improving.  He now uses a walker to ambulate and is responding to PT/OT.  His initial confusion has slowly been improving to his usual baseline.  He has had no falls."      Patient Active Problem List    Diagnosis Date Noted    Vitamin D deficiency 05/25/2019    Acute encephalopathy 05/24/2019    UTI (urinary tract infection) 05/24/2019    Volume depletion 05/24/2019    Physical deconditioning 05/24/2019    Urinary tract infection without hematuria 05/24/2019    Cerebral ventriculomegaly 05/24/2019    Late onset Alzheimer's disease without behavioral disturbance 10/20/2016    Gilbert's disease 06/30/2016     Past Medical History:   Diagnosis Date    Late onset Alzheimer's disease without behavioral disturbance 10/20/2016    Physical deconditioning 5/24/2019     Past Surgical History:   Procedure Laterality Date    COLONOSCOPY  2011     No family history on file.      Current Outpatient Medications   Medication Sig    b complex vitamins tablet Take 1 tablet by mouth once daily.    cholecalciferol, vitamin D3, 1,000 unit capsule Take 1 capsule (1,000 Units total) by mouth once daily.    donepezil (ARICEPT) 10 MG tablet Take 1 tablet (10 mg total) by mouth once daily.    memantine (NAMENDA) 10 MG Tab Take 1 tablet (10 mg total) by mouth 2 (two) times daily.     No current facility-administered medications for this visit.        Review of patient's allergies indicates:  No Known Allergies  Social History     Socioeconomic History    Marital status:      Spouse name: Not on file    Number of children: Not on file    Years of education: Not on file    " "Highest education level: Not on file   Occupational History    Not on file   Social Needs    Financial resource strain: Not on file    Food insecurity:     Worry: Not on file     Inability: Not on file    Transportation needs:     Medical: Not on file     Non-medical: Not on file   Tobacco Use    Smoking status: Never Smoker   Substance and Sexual Activity    Alcohol use: No    Drug use: Not on file    Sexual activity: Not on file   Lifestyle    Physical activity:     Days per week: Not on file     Minutes per session: Not on file    Stress: Not on file   Relationships    Social connections:     Talks on phone: Not on file     Gets together: Not on file     Attends Hindu service: Not on file     Active member of club or organization: Not on file     Attends meetings of clubs or organizations: Not on file     Relationship status: Not on file   Other Topics Concern    Not on file   Social History Narrative    Not on file       Review of Systems  Constitutional: negative  Eyes: negative  Ears, nose, mouth, throat, and face: negative  Respiratory: negative  Cardiovascular: negative  Gastrointestinal: negative  Genitourinary:negative  Integument/breast: negative  Hematologic/lymphatic: negative  Musculoskeletal:negative  Neurological: negative  Behavioral/Psych: negative  Endocrine: negative  Allergic/Immunologic: negative    Objective:  Vital signs in last 24 hours:    Vitals:    08/27/19 0830   BP: 110/66   Pulse: 62   Weight: 40.6 kg (89 lb 8.1 oz)   Height: 5' 5" (1.651 m)       Body mass index is 14.89 kg/m².     General: no acute distress, well nourished, well-groomed  CVS: RRR, no murmur, gallops or rubs  Respiratory: Clear to ausculation  Extremities: no edema    Neurological Examination:    HIGHER INTEGRATIVE FUNCTIONS:  -Normal attention span and concentration; immediately responds to questions and commands  -Oriented to time, place and person  -Recent and remote memory intact  -Language normal " (no aphasia or dysarthria)  -Normal fund of knowledge    CN:  -PERRLA, visual fields full, unable to visualize optic discs due to small pupils on fundus exam   -EOMI with normal saccades and smooth pursuit  -Facial sensation intact bilaterally  -Facial strength/movement intact bilaterally  -Hearing intact to voice  -Palate elevates symmetrically  -Normal shoulder shrug and head turn  -Tongue protrudes midline    MOTOR: (left/right graded 1-5)  -UE: 5/5 deltoids; 5/5 biceps, triceps; 5/5 wrist flexors, extensors; 5/5 interosseous; 5/5   -LEs: 5/5 hip flexion, extension; 5/5 knee flexion, extension; 5/5 ankle flexion, extension  -Tone: paratonia  -No pronator drift, no orbiting    SENSORY:  -Light touch, temperature sensation intact bilaterally    REFLEXES:  -2+ upper and lower bilaterally  -Flexor plantar reflex bilaterally  -No clonus    COORDINATION:  -FNF, HKS, NEVAEH intact bilaterally    GAIT:  -He was not able to stand without assistance; he was not able to walk without assistance. Gait with small strides and slow    Imaging    CT Head:    No acute large vascular territory infarct or intracranial hemorrhage identified.  Further evaluation/follow-up as warranted.    Generalized cerebral volume loss and supratentorial white matter mild chronic small vessel ischemic change.    Ventriculomegaly out of proportion to sulcal prominence, nonspecific but can be seen with normal pressure hydrocephalus.  Correlate clinically.    Assessment/Plan:  1. Dementia, suspect late onset alzheimer's  2. Unsteady gait    Plan:  Continue Aricept and memantine  I will refer patient to movement disorder specialist for evaluation of his gait and bradykinesia (?parkinsonisms).  However, daughter said they are not interested in investigations for NPH or surgical procedures.     I discussed assessment and plan with patient/daughter and answered the questions that they had.     Part of patient note might have been created using Dragon  Dictation.  Any errors in syntax or even information may not have been identified and edited on initial review prior to signing this note.

## 2019-08-29 NOTE — TELEPHONE ENCOUNTER
----- Message from Romana Sullivan sent at 8/27/2019 10:04 AM CDT -----  Patient has a referral to Neurology, is there anyway he can be scheduled sooner?

## 2019-08-31 ENCOUNTER — HOSPITAL ENCOUNTER (EMERGENCY)
Facility: HOSPITAL | Age: 79
Discharge: HOME | End: 2019-08-31
Attending: EMERGENCY MEDICINE
Payer: MEDICARE

## 2019-08-31 VITALS
HEIGHT: 65 IN | RESPIRATION RATE: 16 BRPM | DIASTOLIC BLOOD PRESSURE: 67 MMHG | OXYGEN SATURATION: 96 % | BODY MASS INDEX: 14.66 KG/M2 | TEMPERATURE: 98 F | HEART RATE: 58 BPM | SYSTOLIC BLOOD PRESSURE: 126 MMHG | WEIGHT: 88 LBS

## 2019-08-31 DIAGNOSIS — R07.81 RIB PAIN ON LEFT SIDE: ICD-10-CM
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
## 2019-08-31 PROCEDURE — 99283 EMERGENCY DEPT VISIT LOW MDM: CPT | Mod: 25

## 2019-08-31 NOTE — ED TRIAGE NOTES
Patient's daughter states her mother states patient fell from lazy boy. Daughter states father states he has pain to his left side above his hip area.

## 2019-08-31 NOTE — ED PROVIDER NOTES
Encounter Date: 8/31/2019    SCRIBE #1 NOTE: I, Sanford Knutson, am scribing for, and in the presence of,  . I have scribed the entire note.       History     Chief Complaint   Patient presents with    Fall     Reports slipped out of a chair last night injuring R hip. Family reports has been able to stand with assist which is baseline for pt.      Héctor Harvey is a 79 y.o. male who  has a past medical history of Late onset Alzheimer's disease without behavioral disturbance (10/20/2016) and Physical deconditioning (5/24/2019).    The patient presents to the ED due to a fall. Patient's wife reports the patient slipped out of a chair onto the floor. She was not in the room at the time and no one witnessed the fall.  He was in the chair when she left the room and on the floor when she returned.  He complained of left hip pain. The patient was able to walk with her assistance to his bed. She applied a heating pad which improved his pain.  This morning he did not seem to be in any pain and walked to the kitchen without difficulty.  Patient's daughter reported to his wife that the patient complained of still being in pain this morning.  He seemed to yelp when he moved a certain way.  She gave him Advil PM.  Pain is on his left side.      The history is provided by the spouse.     Review of patient's allergies indicates:  No Known Allergies  Past Medical History:   Diagnosis Date    Late onset Alzheimer's disease without behavioral disturbance 10/20/2016    Physical deconditioning 5/24/2019     Past Surgical History:   Procedure Laterality Date    COLONOSCOPY  2011     History reviewed. No pertinent family history.  Social History     Tobacco Use    Smoking status: Never Smoker   Substance Use Topics    Alcohol use: No    Drug use: Never     Review of Systems   Gastrointestinal: Negative for nausea and vomiting.   Musculoskeletal: Negative for back pain, gait problem and joint swelling.   Skin: Negative for  color change and wound.   Neurological: Negative for syncope and headaches.   All other systems reviewed and are negative.      Physical Exam     Initial Vitals [08/31/19 1121]   BP Pulse Resp Temp SpO2   110/70 74 16 98.2 °F (36.8 °C) 97 %      MAP       --         Physical Exam    Nursing note and vitals reviewed.  Constitutional: No distress.   Very thin elderly male in no acute distress   HENT:   Head: Normocephalic and atraumatic.   Eyes: Conjunctivae are normal.   Neck: Normal range of motion.   Cardiovascular: Normal rate, regular rhythm and normal heart sounds.   No murmur heard.  Pulmonary/Chest: Breath sounds normal. No respiratory distress. He exhibits tenderness.   Left lateral rib tenderness   Abdominal: Bowel sounds are normal. He exhibits no distension.   Musculoskeletal: Normal range of motion. He exhibits tenderness.   Left lateral rib tenderness, no overlying skin changes   Neurological: He is alert. He has normal strength.   Skin: Skin is warm and dry.   No bruising, redness, or other skin changes   Psychiatric: He has a normal mood and affect. His behavior is normal.         ED Course   Procedures  Labs Reviewed - No data to display       Imaging Results          X-Ray Hip 2 View Left (Final result)  Result time 08/31/19 12:22:19    Final result by Dwayne Wolf MD (08/31/19 12:22:19)                 Impression:      No acute displaced fracture-dislocation identified.      Electronically signed by: Dwayne Wolf MD  Date:    08/31/2019  Time:    12:22             Narrative:    EXAMINATION:  XR HIP 2 VIEW LEFT    CLINICAL HISTORY:  Unspecified fall, initial encounter    TECHNIQUE:  AP view of the pelvis and frog leg lateral view of the left hip were performed.    COMPARISON:  None    FINDINGS:  Generalized osteopenia.  Overall alignment is grossly within normal limits allowing for patient rotation.  No displaced fracture, dislocation or destructive osseous process seen.  Age-related mild  degenerative change noted about the pelvis and bilateral hips.  Pelvic phleboliths noted.  No subcutaneous emphysema or radiodense retained foreign body.                               X-Ray Ribs 2 View Left (Final result)  Result time 08/31/19 12:26:19    Final result by Dwayne Wolf MD (08/31/19 12:26:19)                 Impression:      No acute displaced left rib fracture identified.      Electronically signed by: Dwayne Wolf MD  Date:    08/31/2019  Time:    12:26             Narrative:    EXAMINATION:  XR RIBS 2 VIEW LEFT    CLINICAL HISTORY:  Unspecified fall, initial encounter    TECHNIQUE:  Two views of the left ribs were performed.    COMPARISON:  Chest radiograph 05/24/2019    FINDINGS:  Generalized osteopenia.  No displaced left rib fracture identified.  No dislocation or destructive osseous process.  Left hemithorax is clear.  Grossly stable dense nodule within the right mid lung zone suggestive of a calcified granuloma.  No subcutaneous emphysema or radiodense retained foreign body.  Imaged upper abdomen is within normal limits.                                 Medical Decision Making:   History:   I obtained history from: someone other than patient.       <> Summary of History: wife  Clinical Tests:   Radiological Study: Ordered and Reviewed  ED Management:  79M presents with left side pain after an unwitnessed fall out of his chair last night.  No signs of trauma on exam.  Normal weight bearing.   No fx on rays of left ribs or hip.  Treat pain with motrin and tylenol.                        Clinical Impression:       ICD-10-CM ICD-9-CM   1. Fall W19.XXXA E888.9   2. Rib pain on left side R07.81 786.50                 I, Dr. Avelina Mao, personally performed the services described in this documentation.   All medical record entries made by the scribe were at my direction and in my presence.   I have reviewed the chart and agree that the record is accurate and complete.   Avelina Mao MD.  1:01 PM  08/31/2019                    Avelina Mao MD  08/31/19 [id number]

## 2019-08-31 NOTE — DISCHARGE INSTRUCTIONS
You do not have any bony injuries of your ribs or hip from your fall.  You may have bruising and pain.  Treat your pain with heat, ice, motrin or tylenol.  Take motrin 600mg every 6 hours with food and tylenol 1000mg every 6 hours, as needed for pain.

## 2019-08-31 NOTE — ED NOTES
APPEARANCE: Alert, oriented and in no acute distress.  CARDIAC: Normal rate and rhythm, no murmur heard.   PERIPHERAL VASCULAR: peripheral pulses present. Normal cap refill. No edema. Warm to touch.    RESPIRATORY:Normal rate and effort, breath sounds clear bilaterally throughout chest. Respirations are equal and unlabored no obvious signs of distress.  GASTRO: soft, bowel sounds normal, no tenderness, no abdominal distention.  MUSC: Full ROM.Bony tenderness or soft Ribs and hip L side. No obvious deformity.  SKIN: Skin is warm and dry, normal skin turgor, mucous membranes a little dry.  NEURO: 5/5 strength major flexors/extensors bilaterally. Sensory intact to light touch bilaterally. Amma coma scale: eyes open spontaneously-4, oriented & converses-5, obeys commands-6. No neurological abnormalities.   MENTAL STATUS: Alzheimers patient at normal baseline for self.  EYE: PERRL, both eyes: pupils brisk and reactive to light. Normal size.

## 2019-09-03 ENCOUNTER — TELEPHONE (OUTPATIENT)
Dept: NEUROLOGY | Facility: CLINIC | Age: 79
End: 2019-09-03

## 2019-09-03 NOTE — TELEPHONE ENCOUNTER
----- Message from Scarlet Nayak sent at 9/3/2019 11:28 AM CDT -----  Contact: Ynes (daughter) @ [phone]  Pts daughter says she is returning your call.

## 2019-09-18 DIAGNOSIS — R30.0 DYSURIA: Primary | ICD-10-CM

## 2019-10-08 ENCOUNTER — HOSPITAL ENCOUNTER (INPATIENT)
Facility: HOSPITAL | Age: 79
LOS: 3 days | Discharge: HOME HEALTH | DRG: 871 | End: 2019-10-11
Attending: EMERGENCY MEDICINE | Admitting: INTERNAL MEDICINE
Payer: MEDICARE

## 2019-10-08 DIAGNOSIS — N30.00 ACUTE CYSTITIS WITHOUT HEMATURIA: ICD-10-CM

## 2019-10-08 DIAGNOSIS — A41.9 SEPSIS SECONDARY TO UTI: ICD-10-CM

## 2019-10-08 DIAGNOSIS — N30.01 ACUTE CYSTITIS WITH HEMATURIA: Primary | ICD-10-CM

## 2019-10-08 DIAGNOSIS — R53.1 WEAKNESS: ICD-10-CM

## 2019-10-08 DIAGNOSIS — A41.9 SEPSIS: ICD-10-CM

## 2019-10-08 DIAGNOSIS — N39.0 SEPSIS SECONDARY TO UTI: ICD-10-CM

## 2019-10-08 LAB
ALBUMIN SERPL BCP-MCNC: 3.9 G/DL (ref 3.5–5.2)
ALP SERPL-CCNC: 155 U/L (ref 55–135)
ALT SERPL W/O P-5'-P-CCNC: 6 U/L (ref 10–44)
AMMONIA PLAS-SCNC: 23 UMOL/L (ref 10–50)
ANION GAP SERPL CALC-SCNC: 13 MMOL/L (ref 8–16)
AST SERPL-CCNC: 12 U/L (ref 10–40)
BACTERIA #/AREA URNS HPF: ABNORMAL /HPF
BASOPHILS # BLD AUTO: 0.01 K/UL (ref 0–0.2)
BASOPHILS NFR BLD: 0.1 % (ref 0–1.9)
BILIRUB SERPL-MCNC: 4.6 MG/DL (ref 0.1–1)
BILIRUB UR QL STRIP: ABNORMAL
BUN SERPL-MCNC: 19 MG/DL (ref 8–23)
CALCIUM SERPL-MCNC: 9.6 MG/DL (ref 8.7–10.5)
CHLORIDE SERPL-SCNC: 100 MMOL/L (ref 95–110)
CLARITY UR: ABNORMAL
CO2 SERPL-SCNC: 26 MMOL/L (ref 23–29)
COLOR UR: ABNORMAL
CREAT SERPL-MCNC: 0.8 MG/DL (ref 0.5–1.4)
DIFFERENTIAL METHOD: ABNORMAL
EOSINOPHIL # BLD AUTO: 0 K/UL (ref 0–0.5)
EOSINOPHIL NFR BLD: 0 % (ref 0–8)
ERYTHROCYTE [DISTWIDTH] IN BLOOD BY AUTOMATED COUNT: 13.5 % (ref 11.5–14.5)
EST. GFR  (AFRICAN AMERICAN): >60 ML/MIN/1.73 M^2
EST. GFR  (NON AFRICAN AMERICAN): >60 ML/MIN/1.73 M^2
GLUCOSE SERPL-MCNC: 141 MG/DL (ref 70–110)
GLUCOSE UR QL STRIP: NEGATIVE
HCT VFR BLD AUTO: 44.3 % (ref 40–54)
HGB BLD-MCNC: 14.7 G/DL (ref 14–18)
HGB UR QL STRIP: NEGATIVE
HYALINE CASTS #/AREA URNS LPF: 5 /LPF
INFLUENZA A, MOLECULAR: NEGATIVE
INFLUENZA B, MOLECULAR: NEGATIVE
KETONES UR QL STRIP: ABNORMAL
LACTATE SERPL-SCNC: 3.3 MMOL/L (ref 0.5–2.2)
LEUKOCYTE ESTERASE UR QL STRIP: NEGATIVE
LYMPHOCYTES # BLD AUTO: 0.9 K/UL (ref 1–4.8)
LYMPHOCYTES NFR BLD: 6.3 % (ref 18–48)
MCH RBC QN AUTO: 30.9 PG (ref 27–31)
MCHC RBC AUTO-ENTMCNC: 33.2 G/DL (ref 32–36)
MCV RBC AUTO: 93 FL (ref 82–98)
MICROSCOPIC COMMENT: ABNORMAL
MONOCYTES # BLD AUTO: 0.6 K/UL (ref 0.3–1)
MONOCYTES NFR BLD: 4.2 % (ref 4–15)
NEUTROPHILS # BLD AUTO: 13 K/UL (ref 1.8–7.7)
NEUTROPHILS NFR BLD: 89.4 % (ref 38–73)
NITRITE UR QL STRIP: POSITIVE
PH UR STRIP: 6 [PH] (ref 5–8)
PLATELET # BLD AUTO: 252 K/UL (ref 150–350)
PMV BLD AUTO: 11.5 FL (ref 9.2–12.9)
POCT GLUCOSE: 132 MG/DL (ref 70–110)
POCT GLUCOSE: 146 MG/DL (ref 70–110)
POTASSIUM SERPL-SCNC: 4.7 MMOL/L (ref 3.5–5.1)
PROT SERPL-MCNC: 7.1 G/DL (ref 6–8.4)
PROT UR QL STRIP: ABNORMAL
RBC # BLD AUTO: 4.76 M/UL (ref 4.6–6.2)
RBC #/AREA URNS HPF: 3 /HPF (ref 0–4)
SODIUM SERPL-SCNC: 139 MMOL/L (ref 136–145)
SP GR UR STRIP: >=1.03 (ref 1–1.03)
SPECIMEN SOURCE: NORMAL
URN SPEC COLLECT METH UR: ABNORMAL
UROBILINOGEN UR STRIP-ACNC: 1 EU/DL
WBC # BLD AUTO: 14.64 K/UL (ref 3.9–12.7)
WBC #/AREA URNS HPF: 6 /HPF (ref 0–5)

## 2019-10-08 PROCEDURE — 93010 EKG 12-LEAD: ICD-10-PCS | Mod: ,,, | Performed by: INTERNAL MEDICINE

## 2019-10-08 PROCEDURE — 82962 GLUCOSE BLOOD TEST: CPT

## 2019-10-08 PROCEDURE — 87040 BLOOD CULTURE FOR BACTERIA: CPT | Mod: 59

## 2019-10-08 PROCEDURE — 12000002 HC ACUTE/MED SURGE SEMI-PRIVATE ROOM

## 2019-10-08 PROCEDURE — 80053 COMPREHEN METABOLIC PANEL: CPT

## 2019-10-08 PROCEDURE — 82140 ASSAY OF AMMONIA: CPT

## 2019-10-08 PROCEDURE — 93005 ELECTROCARDIOGRAM TRACING: CPT

## 2019-10-08 PROCEDURE — 85025 COMPLETE CBC W/AUTO DIFF WBC: CPT

## 2019-10-08 PROCEDURE — 99291 CRITICAL CARE FIRST HOUR: CPT

## 2019-10-08 PROCEDURE — 93010 ELECTROCARDIOGRAM REPORT: CPT | Mod: ,,, | Performed by: INTERNAL MEDICINE

## 2019-10-08 PROCEDURE — 87502 INFLUENZA DNA AMP PROBE: CPT

## 2019-10-08 PROCEDURE — 96365 THER/PROPH/DIAG IV INF INIT: CPT

## 2019-10-08 PROCEDURE — 81000 URINALYSIS NONAUTO W/SCOPE: CPT

## 2019-10-08 PROCEDURE — 83605 ASSAY OF LACTIC ACID: CPT

## 2019-10-08 PROCEDURE — 63600175 PHARM REV CODE 636 W HCPCS: Performed by: EMERGENCY MEDICINE

## 2019-10-08 PROCEDURE — 96361 HYDRATE IV INFUSION ADD-ON: CPT

## 2019-10-08 RX ADMIN — CEFTRIAXONE 2 G: 2 INJECTION, SOLUTION INTRAVENOUS at 10:10

## 2019-10-08 RX ADMIN — SODIUM CHLORIDE 1170 ML: 0.9 INJECTION, SOLUTION INTRAVENOUS at 10:10

## 2019-10-09 PROBLEM — A41.9 SEPSIS SECONDARY TO UTI: Status: ACTIVE | Noted: 2019-10-09

## 2019-10-09 PROBLEM — A41.9 SEPSIS: Status: ACTIVE | Noted: 2019-10-09

## 2019-10-09 PROBLEM — N39.0 SEPSIS SECONDARY TO UTI: Status: ACTIVE | Noted: 2019-10-09

## 2019-10-09 LAB
ANION GAP SERPL CALC-SCNC: 7 MMOL/L (ref 8–16)
BASOPHILS # BLD AUTO: 0.02 K/UL (ref 0–0.2)
BASOPHILS NFR BLD: 0.2 % (ref 0–1.9)
BUN SERPL-MCNC: 16 MG/DL (ref 8–23)
CALCIUM SERPL-MCNC: 8.8 MG/DL (ref 8.7–10.5)
CHLORIDE SERPL-SCNC: 108 MMOL/L (ref 95–110)
CO2 SERPL-SCNC: 26 MMOL/L (ref 23–29)
CREAT SERPL-MCNC: 0.7 MG/DL (ref 0.5–1.4)
DIFFERENTIAL METHOD: ABNORMAL
EOSINOPHIL # BLD AUTO: 0 K/UL (ref 0–0.5)
EOSINOPHIL NFR BLD: 0.2 % (ref 0–8)
ERYTHROCYTE [DISTWIDTH] IN BLOOD BY AUTOMATED COUNT: 13.5 % (ref 11.5–14.5)
EST. GFR  (AFRICAN AMERICAN): >60 ML/MIN/1.73 M^2
EST. GFR  (NON AFRICAN AMERICAN): >60 ML/MIN/1.73 M^2
GLUCOSE SERPL-MCNC: 121 MG/DL (ref 70–110)
HCT VFR BLD AUTO: 36.8 % (ref 40–54)
HGB BLD-MCNC: 12 G/DL (ref 14–18)
LACTATE SERPL-SCNC: 2.3 MMOL/L (ref 0.5–2.2)
LYMPHOCYTES # BLD AUTO: 1.1 K/UL (ref 1–4.8)
LYMPHOCYTES NFR BLD: 10.6 % (ref 18–48)
MCH RBC QN AUTO: 30.2 PG (ref 27–31)
MCHC RBC AUTO-ENTMCNC: 32.6 G/DL (ref 32–36)
MCV RBC AUTO: 93 FL (ref 82–98)
MONOCYTES # BLD AUTO: 0.7 K/UL (ref 0.3–1)
MONOCYTES NFR BLD: 6.5 % (ref 4–15)
NEUTROPHILS # BLD AUTO: 8.5 K/UL (ref 1.8–7.7)
NEUTROPHILS NFR BLD: 82.5 % (ref 38–73)
PLATELET # BLD AUTO: 197 K/UL (ref 150–350)
PMV BLD AUTO: 11.2 FL (ref 9.2–12.9)
POTASSIUM SERPL-SCNC: 4.3 MMOL/L (ref 3.5–5.1)
RBC # BLD AUTO: 3.97 M/UL (ref 4.6–6.2)
SODIUM SERPL-SCNC: 141 MMOL/L (ref 136–145)
WBC # BLD AUTO: 10.41 K/UL (ref 3.9–12.7)

## 2019-10-09 PROCEDURE — 63600175 PHARM REV CODE 636 W HCPCS: Performed by: STUDENT IN AN ORGANIZED HEALTH CARE EDUCATION/TRAINING PROGRAM

## 2019-10-09 PROCEDURE — 12000002 HC ACUTE/MED SURGE SEMI-PRIVATE ROOM

## 2019-10-09 PROCEDURE — 96372 THER/PROPH/DIAG INJ SC/IM: CPT | Mod: 59 | Performed by: EMERGENCY MEDICINE

## 2019-10-09 PROCEDURE — 25000003 PHARM REV CODE 250: Performed by: EMERGENCY MEDICINE

## 2019-10-09 PROCEDURE — 85025 COMPLETE CBC W/AUTO DIFF WBC: CPT

## 2019-10-09 PROCEDURE — 94761 N-INVAS EAR/PLS OXIMETRY MLT: CPT

## 2019-10-09 PROCEDURE — 83605 ASSAY OF LACTIC ACID: CPT

## 2019-10-09 PROCEDURE — 96361 HYDRATE IV INFUSION ADD-ON: CPT | Performed by: EMERGENCY MEDICINE

## 2019-10-09 PROCEDURE — 97162 PT EVAL MOD COMPLEX 30 MIN: CPT

## 2019-10-09 PROCEDURE — 25000003 PHARM REV CODE 250: Performed by: STUDENT IN AN ORGANIZED HEALTH CARE EDUCATION/TRAINING PROGRAM

## 2019-10-09 PROCEDURE — 80048 BASIC METABOLIC PNL TOTAL CA: CPT

## 2019-10-09 RX ORDER — ONDANSETRON 2 MG/ML
4 INJECTION INTRAMUSCULAR; INTRAVENOUS EVERY 8 HOURS PRN
Status: DISCONTINUED | OUTPATIENT
Start: 2019-10-09 | End: 2019-10-11 | Stop reason: HOSPADM

## 2019-10-09 RX ORDER — ACETAMINOPHEN 325 MG/1
650 TABLET ORAL EVERY 4 HOURS PRN
Status: DISCONTINUED | OUTPATIENT
Start: 2019-10-09 | End: 2019-10-11 | Stop reason: HOSPADM

## 2019-10-09 RX ORDER — ENOXAPARIN SODIUM 100 MG/ML
40 INJECTION SUBCUTANEOUS EVERY 24 HOURS
Status: DISCONTINUED | OUTPATIENT
Start: 2019-10-09 | End: 2019-10-11 | Stop reason: HOSPADM

## 2019-10-09 RX ORDER — SODIUM CHLORIDE 0.9 % (FLUSH) 0.9 %
10 SYRINGE (ML) INJECTION
Status: DISCONTINUED | OUTPATIENT
Start: 2019-10-09 | End: 2019-10-11 | Stop reason: HOSPADM

## 2019-10-09 RX ORDER — DONEPEZIL HYDROCHLORIDE 5 MG/1
10 TABLET, FILM COATED ORAL DAILY
Status: DISCONTINUED | OUTPATIENT
Start: 2019-10-09 | End: 2019-10-11 | Stop reason: HOSPADM

## 2019-10-09 RX ORDER — AZITHROMYCIN 250 MG/1
500 TABLET, FILM COATED ORAL
Status: COMPLETED | OUTPATIENT
Start: 2019-10-09 | End: 2019-10-09

## 2019-10-09 RX ORDER — MEMANTINE HYDROCHLORIDE 5 MG/1
10 TABLET ORAL 2 TIMES DAILY
Status: DISCONTINUED | OUTPATIENT
Start: 2019-10-09 | End: 2019-10-11 | Stop reason: HOSPADM

## 2019-10-09 RX ADMIN — AZITHROMYCIN MONOHYDRATE 500 MG: 250 TABLET ORAL at 05:10

## 2019-10-09 RX ADMIN — MEMANTINE HYDROCHLORIDE 10 MG: 5 TABLET ORAL at 09:10

## 2019-10-09 RX ADMIN — DONEPEZIL HYDROCHLORIDE 10 MG: 5 TABLET, FILM COATED ORAL at 08:10

## 2019-10-09 RX ADMIN — ENOXAPARIN SODIUM 40 MG: 100 INJECTION SUBCUTANEOUS at 05:10

## 2019-10-09 RX ADMIN — MEMANTINE HYDROCHLORIDE 10 MG: 5 TABLET ORAL at 08:10

## 2019-10-09 RX ADMIN — CEFTRIAXONE 2 G: 2 INJECTION, SOLUTION INTRAVENOUS at 09:10

## 2019-10-09 RX ADMIN — SODIUM CHLORIDE 500 ML: 0.9 INJECTION, SOLUTION INTRAVENOUS at 06:10

## 2019-10-09 NOTE — PT/OT/SLP EVAL
Physical Therapy Evaluation    Patient Name:  Héctor Harvey   MRN:  [id number]    Recommendations:     Discharge Recommendations:  nursing facility, skilled   Discharge Equipment Recommendations: (Defer to SNF)   Barriers to discharge: Decreased caregiver support and impaired funcitonal mobility    Assessment:     Héctor Harvey is a 79 y.o. male admitted with a medical diagnosis of Sepsis.  He presents with the following impairments/functional limitations:  weakness, impaired balance, impaired self care skills, decreased coordination, decreased safety awareness, decreased ROM, impaired joint extensibility, impaired endurance, impaired functional mobilty, decreased upper extremity function, impaired coordination, gait instability, impaired cognition, decreased lower extremity function, impaired cardiopulmonary response to activity, impaired fine motor. PT initial evaluation completed. Plan of care and goals established and discussed with patient's wife and daughter. Pt required Mod A for bed mobility and transfer; mod<> Max A c/ significantly unsteady side steps to EOB c/ RW. Also significantly poor (safety awareness) d/t dementia.     Rehab Prognosis: Good; patient would benefit from acute skilled PT services to address these deficits and reach maximum level of function.    Recent Surgery: * No surgery found *      Plan:     During this hospitalization, patient to be seen 6 x/week to address the identified rehab impairments via gait training, therapeutic activities, therapeutic exercises, neuromuscular re-education and progress toward the following goals:    · Plan of Care Expires:  11/09/19    Subjective     Chief Complaint: NA    Patient/Family Comments/goals: Pt daughter/wife agreed to PT POC  Pain/Comfort:  · Pain Rating 1: 0/10  · Pain Rating Post-Intervention 1: 0/10    Patients cultural, spiritual, Rastafari conflicts given the current situation: no    Living Environment:  Pt lives c/ wife in [facility]; 1STE. Per  daughter pt has sitter 3/wk 4hrs; plan is to increase it to 5days/4hrs per week.  Prior to admission, patients level of function was Mod I; per daughter, pt is non-compliant c/ poor use of RW d/t dementia.  Equipment used at home: wheelchair, walker, rolling, bedside commode, shower chair, grab bar.  DME owned (not currently used): none.  Upon discharge, patient will have assistance from SNF/family/sitter.    Objective:     Communicated with RNSahe prior to session.  Patient found supine with bed alarm, telemetry  upon PT entry to room.    General Precautions: Standard, fall   Orthopedic Precautions:N/A   Braces: N/A     Exams:  · Cognitive Exam:  Patient is oriented to Person and confused; advanced dementia; one step commands d/t dementia.  · Gross Motor Coordination:  significantly impaired c/ bed mobility and gait   · Postural Exam:  Patient presented with the following abnormalities:    · -       Rounded shoulders  · -       Forward head  · -       Posterior pelvic tilt  · -       Kyphosis  · -       cachetic  · RLE ROM: WNL  · RLE Strength: 3+/5 functionally tested.   · LLE ROM: WNL  · LLE Strength: 3+/5 functionally tested.     Functional Mobility:  · Bed Mobility:     · Rolling Left:  moderate assistance; procedural VC  · Scooting: moderate assistance; VC; unable to ff cues; one step commands d/t dementia.  · Supine to Sit: moderate assistance  · Sit to Supine: maximal assistance  · Transfers:     · Sit to Stand:  moderate assistance with rolling walker  · Gait: 3-4 steps to EOB c/ RW; pt demo wide VICKI; crouched posture; unable to ff verbal commands/cues for safety; demo short hesistant steps; alsoconfused c/ poor safety awareness  · Balance: dynmaic balance- poor      Therapeutic Activities and Exercises:  PT pastor completed c/ progressive mobility as detailed above.  Pt's wife/daughter educated on PT role/ POC.     AM-PAC 6 CLICK MOBILITY  Total Score:10     Patient left supine with call button in  reach, bed alarm on, RN notified and family present.    GOALS:   Multidisciplinary Problems     Physical Therapy Goals        Problem: Physical Therapy Goal    Goal Priority Disciplines Outcome Goal Variances Interventions   Physical Therapy Goal     PT, PT/OT Ongoing, Progressing     Description:  Goals to be met by:2019     Patient will increase functional independence with mobility by performin. Supine to sit with Set-up Schuyler  2. Sit to stand transfer with Contact Guard Assistance  3. Bed to chair transfer with Contact Guard Assistance using Rolling Walker  4. Gait  x 20 feet with Contact Guard Assistance using Rolling Walker.   5. Lower extremity exercise program x15 reps per handout, with assistance as needed                      History:     Past Medical History:   Diagnosis Date    Late onset Alzheimer's disease without behavioral disturbance 10/20/2016    Physical deconditioning 2019       Past Surgical History:   Procedure Laterality Date    COLONOSCOPY         Time Tracking:     PT Received On: 10/09/19  PT Start Time: 1241     PT Stop Time: 1257  PT Total Time (min): 16 min     Billable Minutes: Evaluation 16      Robbin Taylor PT, DPT  10/9/2019

## 2019-10-09 NOTE — ED NOTES
Pt had a small formed BM. Pt cleaned, changed, and repositioned. Pt tolerated well. rr even and unlabored on ra. Nadn. Pt denies any needs at this time. Call light within reach. Family at bedside.

## 2019-10-09 NOTE — ED PROVIDER NOTES
Encounter Date: 10/8/2019    SCRIBE #1 NOTE: I, Nuha White, am scribing for, and in the presence of,  Dr. Heredia. I have scribed the entire note.     I, Dr. Sharita Heredia MD, personally performed the services described in this documentation. All medical record entries made by the scribe were at my direction and in my presence.  I have reviewed the chart and agree that the record reflects my personal performance and is accurate and complete. Sharita Heredia MD.    History     Chief Complaint   Patient presents with    Weakness     weakness and fatigue with ams per family. patients family states patient has hx of dementia has been generally weak and fatigued today family states similar symptoms when patient had uti few months ago      CHIEF COMPLAINT: Patient presents with: Weakness    HISTORY OF PRESENT ILLNESS: Héctor Harvey who is a 79 y.o. presents to the emergency department today with complaint of weakness that began this afternoon. Per wife, patient has been taking in fluids, and his last meal was at 10 am. Patient's appetite decreased then on after lunch. Per wife, patient usually walks with help or with walker, but could not today. Per wife patient had a runny nose yesterday with a cough, but does not currently have fever, N/V/D, SOB, chest pain, or HA. Reports that he has gotten like this in the past when he had a UTI.     ALLERGIES REVIEWED  MEDICATIONS REVIEWED  PMH/PSH/SOC/FH REVIEWED     The history is provided by the patient.    Nursing/Ancillary staff note reviewed.    The history is provided by the spouse.     Review of patient's allergies indicates:  No Known Allergies  Past Medical History:   Diagnosis Date    Late onset Alzheimer's disease without behavioral disturbance 10/20/2016    Physical deconditioning 5/24/2019     Past Surgical History:   Procedure Laterality Date    COLONOSCOPY  2011     History reviewed. No pertinent family history.  Social History     Tobacco Use    Smoking  status: Never Smoker   Substance Use Topics    Alcohol use: No    Drug use: Never     Review of Systems   Constitutional: Positive for appetite change. Negative for activity change, chills, diaphoresis and fever.   HENT: Negative for congestion, drooling, ear pain, rhinorrhea, sneezing, sore throat and trouble swallowing.    Eyes: Negative for pain.   Respiratory: Negative for cough, chest tightness, shortness of breath, wheezing and stridor.    Cardiovascular: Negative for chest pain, palpitations and leg swelling.   Gastrointestinal: Negative for abdominal distention, abdominal pain, constipation, diarrhea, nausea and vomiting.   Genitourinary: Negative for difficulty urinating, dysuria, frequency and urgency.   Musculoskeletal: Negative for arthralgias, back pain, myalgias, neck pain and neck stiffness.   Skin: Negative for pallor, rash and wound.   Neurological: Positive for weakness. Negative for dizziness, syncope, light-headedness, numbness and headaches.   All other systems reviewed and are negative.      Physical Exam     Initial Vitals   BP Pulse Resp Temp SpO2   10/08/19 1858 10/08/19 1858 10/08/19 1858 10/08/19 2130 10/08/19 1858   (!) 140/93 100 20 100.1 °F (37.8 °C) 98 %      MAP       --                Physical Exam    Nursing note and vitals reviewed.  Constitutional: He is not diaphoretic. No distress.   Frail cachectic male. Temperature of 100.1   HENT:   Head: Normocephalic and atraumatic.   Nose: Nose normal.   Mouth/Throat: Oropharynx is clear and moist. Mucous membranes are dry.   Eyes: Conjunctivae and EOM are normal. Pupils are equal, round, and reactive to light. No scleral icterus.   Neck: Normal range of motion. Neck supple. No JVD present.   Cardiovascular: Normal rate, regular rhythm and normal heart sounds. Exam reveals no gallop and no friction rub.    No murmur heard.  Pulmonary/Chest: Breath sounds normal. No stridor. No respiratory distress. He has no wheezes. He exhibits no  tenderness.   Abdominal: Soft. Bowel sounds are normal. He exhibits no distension and no mass. There is no tenderness. There is no rebound and no guarding.   Musculoskeletal: Normal range of motion. He exhibits no edema or tenderness.        Cervical back: Normal.        Thoracic back: Normal.        Lumbar back: Normal.   Lymphadenopathy:     He has no cervical adenopathy.   Neurological: No cranial nerve deficit.   Alert to self and family.    Skin: Skin is warm and dry. No rash noted. No pallor.         ED Course   Critical Care  Date/Time: 10/9/2019 4:53 AM  Performed by: Sharita Heredia MD  Authorized by: Sharita Heredia MD   Total critical care time (exclusive of procedural time) : 34 minutes  Critical care time was exclusive of separately billable procedures and treating other patients.  Critical care was necessary to treat or prevent imminent or life-threatening deterioration of the following conditions: sepsis.  Critical care was time spent personally by me on the following activities: development of treatment plan with patient or surrogate, discussions with primary provider, evaluation of patient's response to treatment, examination of patient, obtaining history from patient or surrogate, ordering and performing treatments and interventions, ordering and review of laboratory studies, ordering and review of radiographic studies, pulse oximetry, re-evaluation of patient's condition and review of old charts.        Labs Reviewed   CBC W/ AUTO DIFFERENTIAL - Abnormal; Notable for the following components:       Result Value    WBC 14.64 (*)     Gran # (ANC) 13.0 (*)     Lymph # 0.9 (*)     Gran% 89.4 (*)     Lymph% 6.3 (*)     All other components within normal limits   COMPREHENSIVE METABOLIC PANEL - Abnormal; Notable for the following components:    Glucose 141 (*)     Total Bilirubin 4.6 (*)     Alkaline Phosphatase 155 (*)     ALT 6 (*)     All other components within normal limits   LACTIC ACID,  PLASMA - Abnormal; Notable for the following components:    Lactate (Lactic Acid) 3.3 (*)     All other components within normal limits   URINALYSIS, REFLEX TO URINE CULTURE - Abnormal; Notable for the following components:    Color, UA Orange (*)     Appearance, UA Hazy (*)     Specific Gravity, UA >=1.030 (*)     Protein, UA 1+ (*)     Ketones, UA Trace (*)     Bilirubin (UA) 1+ (*)     Nitrite, UA Positive (*)     All other components within normal limits    Narrative:     Preferred Collection Type->Urine, Clean Catch   URINALYSIS MICROSCOPIC - Abnormal; Notable for the following components:    WBC, UA 6 (*)     Hyaline Casts, UA 5 (*)     All other components within normal limits    Narrative:     Preferred Collection Type->Urine, Clean Catch   LACTIC ACID, PLASMA - Abnormal; Notable for the following components:    Lactate (Lactic Acid) 2.3 (*)     All other components within normal limits   POCT GLUCOSE - Abnormal; Notable for the following components:    POCT Glucose 132 (*)     All other components within normal limits   POCT GLUCOSE - Abnormal; Notable for the following components:    POCT Glucose 146 (*)     All other components within normal limits   INFLUENZA A & B BY MOLECULAR   CULTURE, BLOOD   CULTURE, BLOOD   AMMONIA   BASIC METABOLIC PANEL   CBC W/ AUTO DIFFERENTIAL   POCT GLUCOSE MONITORING CONTINUOUS         EK beats per minute, sinus rhythm with sinus arrhythmia, no ST elevations, T-wave abnormalities in the lateral leads, abnormal EKG      Imaging Results          X-Ray Chest AP Portable (Final result)  Result time 10/08/19 22:55:18    Final result by Joyce Jj MD (10/08/19 22:55:18)                 Impression:      Questionable retrocardiac opacity seen within the left lower lung zone which could reflect aspiration or developing pneumonia in the right clinical setting.      Electronically signed by: Joyce Jj MD  Date:    10/08/2019  Time:    22:55             Narrative:     EXAMINATION:  XR CHEST AP PORTABLE    CLINICAL HISTORY:  Sepsis;    TECHNIQUE:  Single frontal view of the chest was performed.    COMPARISON:  08/31/2019.    FINDINGS:  Patient is rotated.  Cardiac silhouette is normal in size.  Lungs are symmetrically expanded.  Questionable retrocardiac opacity is seen at the left lung base.  No evidence of pneumothorax or significant pleural effusion.  Stable suspected calcified granuloma seen within the right midlung zone.  No acute osseous abnormality identified.                                 Medical Decision Making:   History:   Old Medical Records: I decided to obtain old medical records.  Initial Assessment:   The pt presents to the ED today with weakness. He has fever today.  He is not acting his usual self.  Will obtain workup including lactic acid, hydrate with IV fluids at 30 cc/kg and treat with a broad-spectrum antibiotic  Differential Diagnosis:   Electrolyte abnormality, hypoglycemia, CVA, spinal cord abnormality, infectious causes, Guillain Creola, neuromuscular junction disease, muscle disease, endocrine abnormalities, sepsis.  Clinical Tests:   Lab Tests: Ordered and Reviewed  Radiological Study: Ordered and Reviewed  Medical Tests: Ordered and Reviewed  ED Management:  This is a 79-year-old male who presents to the emergency department today with weakness, altered mental status.  Workup today shows positive nitrites.  He has been re-treated for UTI.  Chest x-ray shows a questionable retrocardiac opacity but he has not had any cough or sputum production.  Still has been treated with antibiotics Rocephin, and azithromycin, and he will be admitted for further management.  Discussed the workup with the patient's family members and they understand.  They agree with admission.    I spoke with [facility] regarding the patient's presentation they accept for admission.                      Clinical Impression:       ICD-10-CM ICD-9-CM   1. Acute cystitis with  hematuria N30.01 595.0   2. Sepsis A41.9 038.9     995.91   3. Weakness R53.1 780.79   4. Sepsis secondary to UTI A41.9 038.9    N39.0 995.91     599.0                               Sharita Heredia MD  10/09/19 0454

## 2019-10-09 NOTE — ED NOTES
Report received from JAY Sterling. Assumed care of pt at this time. Pt sleeping, easily woken. rr even and unlabored on ra. Nadn. Cardiac monitoring continued. Family at bedside. Update family on plan of care. Pt verbalized understanding.

## 2019-10-09 NOTE — ED TRIAGE NOTES
Pt presented ED via EMS due to weakness, pt family states he was walking and talking fine yesterday and on awakening today pt feeling extremely weak , not wanting to move, and balling up to side not wanting to be bothered , pt states he had an episode like this a month ago when he had a bad uti. Pt has alzheimer's and unable to communicate pain, or changes, assessment based on family knowledge

## 2019-10-09 NOTE — ED NOTES
Pt sleeping. rr even and unlabored on ra. Nadn. Pt family at bedside. Updated family on plan of care, verbalized understanding.

## 2019-10-09 NOTE — PLAN OF CARE
10/09/19 1046   Discharge Assessment   Assessment Type Discharge Planning Assessment   Confirmed/corrected address and phone number on facesheet? Yes   Assessment information obtained from? Caregiver  (Estrellita(wife) & Ynes(dtr) completed the assessment b/c the pt was asleep)   Prior to hospitilization cognitive status: Alert/Oriented   Prior to hospitalization functional status: Assistive Equipment;Needs Assistance   Current cognitive status: Alert/Oriented   Current Functional Status: Assistive Equipment;Needs Assistance   Lives With spouse   Able to Return to Prior Arrangements yes   Is patient able to care for self after discharge? No   Who are your caregiver(s) and their phone number(s)? Estrellita(wife)[phone]/ Ynes(dtr)[phone]   Patient's perception of discharge disposition home or selfcare   Readmission Within the Last 30 Days no previous admission in last 30 days   Patient currently being followed by outpatient case management? No   Patient currently receives any other outside agency services? No   Equipment Currently Used at Home wheelchair;walker, rolling;bedside commode;shower chair;raised toilet;other (see comments)  (bars in the shower)   Do you have any problems affording any of your prescribed medications? No   Is the patient taking medications as prescribed? yes   Does the patient have transportation home? Yes   Does the patient receive services at [facility]? No   Discharge Plan A Home with family   Discharge Plan B Home Health   DME Needed Upon Discharge  other (see comments)  (TBD)   Patient/Family in Agreement with Plan yes     Chief Complaint      Weakness (weakness and fatigue with ams per family. patients family states patient has hx of dementia has been generally weak and fatigued today family states similar symptoms when patient had uti few months ago )   for 1 x week.      Subjective:      History of Present Illness:  Héctor Harvey is a 79 y.o.  male who  has a past medical  history of Late onset Alzheimer's disease without behavioral disturbance (10/20/2016) and Physical deconditioning (5/24/2019).. The patient presented to [facility] on 10/[facility]/2019 with a primary complaint of Weakness (weakness and fatigue with ams per family. patients family states patient has hx of dementia has been generally weak and fatigued today family states similar symptoms when patient had uti few months ago )        The patient was in their usual state of health until 1 x week ago when he there was a change in his ADL's including fatigue, talking less, decreased appetite, decrease in walking(normally walks at the house). 1 day ago, his symptoms worsened to wear he did not have the strength to walk to the shower and was more confused without talking to anyone in the household as well as unable to feed himself. Similar symptoms occurred when he had a UTI 5 x months ago. Relative denies pt experiencing chest pain, SOB, falls, abd pain, nausea, vomiting, diarrhea, or dysuria    The Sw met with the pt,his dtr Ynes along with his wife to complete the assessment. The pt does his adl's and his wife assists as needed and he uses dme. The pt's PCP used to be Corinna Roy(moved to [place]) but now it's Dr. Crissy Linares. The pt's dtr Ynes assist a lot also. The pt has transportation home at d/c. The Sw wrote her contact info on the white board in the pt's room and gave them a d/c brochure. The Sw encouraged them to call should they have any questions or concerns.

## 2019-10-09 NOTE — NURSING
Received pt awake and alert oriented x1. Placed tele and VS taken diaper changed. Family at bedside (wife and daughter)No complaints voiced call light in reach bed alarm activated.Skin intact. Will continue monitoring.

## 2019-10-09 NOTE — ED NOTES
CARDIAC: Normal rate and rhythm, no murmur heard.   PERIPHERAL VASCULAR: peripheral pulses present. Normal cap refill. No edema. Warm to touch.    RESPIRATORY:Normal rate and effort, breath sounds clear bilaterally throughout chest. Respirations are equal and unlabored no obvious signs of distress.  GASTRO: soft, bowel sounds normal, no tenderness, no abdominal distention..  SKIN: Skin is warm and dry, normal skin turgor, mucous membranes moist.  MENTAL STATUS: awake, alert and aware of self and family  EYE: PERRL, both eyes: pupils brisk and reactive to light. Normal size.  ENT: EARS: no obvious drainage. NOSE: no active bleeding.   .

## 2019-10-09 NOTE — PLAN OF CARE
Admission questions completed with assistance from family. Discussed POC. Family stated pt was not able to complete US. Bedside nurse notified. Allowed time for questions. Will cont to be available as needed.

## 2019-10-09 NOTE — PT/OT/SLP PROGRESS
Occupational Therapy      Patient Name:  Héctor Harvey   MRN:  [id number]    Patient not seen today secondary to (Family deferred OT eval until tomorrow because pt just back from ultrasound testing where he became aggressive and refused testing.). Will follow-up later date.    Tamera Stone, OT  10/9/2019

## 2019-10-09 NOTE — PLAN OF CARE
Problem: Physical Therapy Goal  Goal: Physical Therapy Goal  Description  Goals to be met by:2019     Patient will increase functional independence with mobility by performin. Supine to sit with Set-up [place]  2. Sit to stand transfer with Contact Guard Assistance  3. Bed to chair transfer with Contact Guard Assistance using Rolling Walker  4. Gait  x 20 feet with Contact Guard Assistance using Rolling Walker.   5. Lower extremity exercise program x15 reps per handout, with assistance as needed     Outcome: Ongoing, Progressing     PT initial evaluation completed. Plan of care and goals established and discussed with patient's wife and daughter. Pt required Mod A for bed mobility and transfer; mod<> Max A c/ significantly unsteady side steps to EOB c/ RW. Also significantly poor (safety awareness) d/t dementia.     Discharge Recommendation: SNF  DME Recommendation: Defer to SNF

## 2019-10-09 NOTE — H&P
[facility] Medicine H&P Note     Admitting Team: [facility] [place] Hospitalist [facility] B  Attending Physician: Thierry Casiano MD  Resident: Mercedes Bermudez  Intern: Cordell Massey MD    Date of Admit: 10/8/2019    Chief Complaint     Weakness (weakness and fatigue with ams per family. patients family states patient has hx of dementia has been generally weak and fatigued today family states similar symptoms when patient had uti few months ago )   for 1 x week.     Subjective:      History of Present Illness:  Héctor Harvey is a 79 y.o.  male who  has a past medical history of Late onset Alzheimer's disease without behavioral disturbance (10/20/2016) and Physical deconditioning (5/24/2019).. The patient presented to [facility] on 10/8/2019 with a primary complaint of Weakness (weakness and fatigue with ams per family. patients family states patient has hx of dementia has been generally weak and fatigued today family states similar symptoms when patient had uti few months ago )      The patient was in their usual state of health until 1 x week ago when he there was a change in his ADL's including fatigue, talking less, decreased appetite, decrease in walking(normally walks at the house). 1 day ago, his symptoms worsened to wear he did not have the strength to walk to the shower and was more confused without talking to anyone in the household as well as unable to feed himself. Similar symptoms occurred when he had a UTI 5 x months ago. Relative denies pt experiencing chest pain, SOB, falls, abd pain, nausea, vomiting, diarrhea, or dysuria.       Past Medical History:  Past Medical History:   Diagnosis Date    Late onset Alzheimer's disease without behavioral disturbance 10/20/2016    Physical deconditioning 5/24/2019       Past Surgical History:  Past Surgical History:   Procedure Laterality Date    COLONOSCOPY  2011       Allergies:  Review of patient's allergies indicates:  No Known Allergies    Home  "Medications:  Prior to Admission medications    Medication Sig Start Date End Date Taking? Authorizing Provider   b complex vitamins tablet Take 1 tablet by mouth once daily.    Historical Provider, MD   cholecalciferol, vitamin D3, 1,000 unit capsule Take 1 capsule (1,000 Units total) by mouth once daily. 10/20/16   Corinna Roy MD   donepezil (ARICEPT) 10 MG tablet Take 1 tablet (10 mg total) by mouth once daily. 19   Corinna Roy MD   memantine (NAMENDA) 10 MG Tab Take 1 tablet (10 mg total) by mouth 2 (two) times daily. 18  Corinna Roy MD       Family History:  History reviewed. No pertinent family history.    Social History:  Social History     Tobacco Use    Smoking status: Never Smoker   Substance Use Topics    Alcohol use: No    Drug use: Never   Occupation:     Review of Systems:  All other systems are reviewed and are negative.    Health Maintaince :   Primary Care Physician: None    Immunizations:   TDap: UTD  Flu: Not UTD   Pna Not UTD    Cancer Screening:  Colonoscopy: UTD 10 yrs ago.      Objective:   Last 24 Hour Vital Signs:  BP  Min: 77/49  Max: 150/84  Temp  Av.8 °F (37.7 °C)  Min: 99.4 °F (37.4 °C)  Max: 100.1 °F (37.8 °C)  Pulse  Av.7  Min: 74  Max: 100  Resp  Av.7  Min: 19  Max: 43  SpO2  Av.6 %  Min: 94 %  Max: 98 %  Height  Av' 5" (165.1 cm)  Min: 5' 5" (165.1 cm)  Max: 5' 5" (165.1 cm)  Weight  Av kg (86 lb)  Min: 39 kg (86 lb)  Max: 39 kg (86 lb)  Body mass index is 14.31 kg/m².  No intake/output data recorded.    Physical Examination:  .Physical Exam   Constitutional: He is oriented to person, place, and time and well-developed, well-nourished, and in no distress. No distress.   HENT:   Head: Normocephalic and atraumatic.   Eyes: Conjunctivae and EOM are normal. Right eye exhibits no discharge. Left eye exhibits no discharge.   Neck: Normal range of motion. Neck supple.   Cardiovascular: Normal rate, regular rhythm, " normal heart sounds and intact distal pulses. Exam reveals no gallop and no friction rub.   No murmur heard.  Pulmonary/Chest: Effort normal and breath sounds normal. No stridor. No respiratory distress. He has no wheezes. He has no rales.   Abdominal: Soft. Bowel sounds are normal. He exhibits no distension. There is no tenderness.   Musculoskeletal: Normal range of motion. He exhibits no edema or deformity.   Neurological: He is alert and oriented to person, place, and time.   Skin: Skin is warm and dry. He is not diaphoretic.   Nursing note and vitals reviewed.      Laboratory:  Most Recent Data:  CBC:   Lab Results   Component Value Date    WBC 14.64 (H) 10/08/2019    HGB 14.7 10/08/2019    HCT 44.3 10/08/2019     10/08/2019    MCV 93 10/08/2019    RDW 13.5 10/08/2019     WBC Differential: 13.0  % N,.9 % L,  4.2% M, 0.0 % Eo, .1 % Baso,  additional cells seen  BMP:   Lab Results   Component Value Date     10/08/2019    K 4.7 10/08/2019     10/08/2019    CO2 26 10/08/2019    BUN 19 10/08/2019    CREATININE 0.8 10/08/2019     (H) 10/08/2019    CALCIUM 9.6 10/08/2019    MG 2.2 03/02/2018     LFTs:   Lab Results   Component Value Date    PROT 7.1 10/08/2019    ALBUMIN 3.9 10/08/2019    BILITOT 4.6 (H) 10/08/2019    AST 12 10/08/2019    ALKPHOS 155 (H) 10/08/2019    ALT 6 (L) 10/08/2019     Coags: No results found for: INR, PROTIME, PTT  FLP:   Lab Results   Component Value Date    CHOL 143 05/24/2019    HDL 43 05/24/2019    LDLCALC 88.2 05/24/2019    TRIG 59 05/24/2019    CHOLHDL 30.1 05/24/2019     DM:   Lab Results   Component Value Date    HGBA1C 5.2 05/24/2019    LDLCALC 88.2 05/24/2019    CREATININE 0.8 10/08/2019     Thyroid:   Lab Results   Component Value Date    TSH 2.564 05/24/2019     Anemia:   Lab Results   Component Value Date    IRON 32 (L) 05/24/2019    TIBC 263 05/24/2019    FERRITIN 336 (H) 05/24/2019    GQWQLEGJ56 537 05/24/2019    FOLATE 14.8 05/24/2019     Cardiac:   Lab  Results   Component Value Date    TROPONINI <0.006 05/24/2019     Urinalysis:   Lab Results   Component Value Date    LABURIN ESCHERICHIA COLI  >100,000 cfu/ml   05/24/2019    COLORU Wirt (A) 10/08/2019    SPECGRAV >=1.030 (A) 10/08/2019    NITRITE Positive (A) 10/08/2019    KETONESU Trace (A) 10/08/2019    UROBILINOGEN 1.0 10/08/2019    WBCUA 6 (H) 10/08/2019       Trended Lab Data:  Recent Labs   Lab 10/08/19  2140   WBC 14.64*   HGB 14.7   HCT 44.3      MCV 93   RDW 13.5      K 4.7      CO2 26   BUN 19   CREATININE 0.8   *   PROT 7.1   ALBUMIN 3.9   BILITOT 4.6*   AST 12   ALKPHOS 155*   ALT 6*       Trended Cardiac Data:  No results for input(s): TROPONINI, CKTOTAL, CKMB, BNP in the last 168 hours.    Microbiology Data:  Microbiology Results (last 7 days)     Procedure Component Value Units Date/Time    Blood culture x two cultures. Draw prior to antibiotics. [[id number]] Collected:  10/08/19 2135    Order Status:  Sent Specimen:  Blood from Peripheral, Forearm, Right Updated:  10/09/19 0152    Blood culture x two cultures. Draw prior to antibiotics. [[id number]] Collected:  10/08/19 2140    Order Status:  Sent Specimen:  Blood from Peripheral, Antecubital, Right Updated:  10/09/19 0152    Influenza A & B by Molecular [[id number]] Collected:  10/08/19 2141    Order Status:  Completed Specimen:  Nasopharyngeal Swab Updated:  10/08/19 2329     Influenza A, Molecular Negative     Influenza B, Molecular Negative     Flu A & B Source NP          Other Results:  10/8  EKG (my interpretation):  Sinus rhythm with marked sinus arrythmia  Right atrial enlargement  T wave abnormality, consider lateral ischemia  Abnormal ECG  When compared with ECG of 25-MAY-2019 01:26,  No significant change was found    Radiology:  Imaging Results          X-Ray Chest AP Portable (Final result)  Result time 10/08/19 22:55:18    Final result by Joyce Jj MD (10/08/19 22:55:18)                 Impression:       Questionable retrocardiac opacity seen within the left lower lung zone which could reflect aspiration or developing pneumonia in the right clinical setting.      Electronically signed by: Joyce Jj MD  Date:    10/08/2019  Time:    22:55             Narrative:    EXAMINATION:  XR CHEST AP PORTABLE    CLINICAL HISTORY:  Sepsis;    TECHNIQUE:  Single frontal view of the chest was performed.    COMPARISON:  08/31/2019.    FINDINGS:  Patient is rotated.  Cardiac silhouette is normal in size.  Lungs are symmetrically expanded.  Questionable retrocardiac opacity is seen at the left lung base.  No evidence of pneumothorax or significant pleural effusion.  Stable suspected calcified granuloma seen within the right midlung zone.  No acute osseous abnormality identified.                                     Assessment:     Héctor Harvey is a 79 y.o. male with:  Patient Active Problem List    Diagnosis Date Noted    Sepsis 10/09/2019    Sepsis secondary to UTI 10/09/2019    Vitamin D deficiency 05/25/2019    Acute encephalopathy 05/24/2019    UTI (urinary tract infection) 05/24/2019    Volume depletion 05/24/2019    Physical deconditioning 05/24/2019    Urinary tract infection without hematuria 05/24/2019    Cerebral ventriculomegaly 05/24/2019    Late onset Alzheimer's disease without behavioral disturbance 10/20/2016    Gilbert's disease 06/30/2016        Plan:   1. Sepsis secondary to UTI:  -Azithromycin 500 in the ED  -LA 2.3  -Ceftriaxone 2g Q24H   -Influ A + B: Negative   -Blood Cultures x 2   -UA: Positive nitrites  -.9% Nacl 500ml bolus given for lower BP at 83 systolic      2. Alzheimer's:  -Donepezil 10mg daily  -Memantine 10mg BID      DVT Ppx: Enoxaparin      Code Status:     Full    Cordell Massey MD  [facility] Internal Medicine [id number]     [facility] Medicine Hospitalist Pager numbers:   [facility] Hospitalist Medicine Team A ([place]): [phone]  [facility] Hospitalist Medicine Team B ([place]min[place]):  [phone]

## 2019-10-09 NOTE — ED NOTES
Patient on cardiac monitor, automatic blood pressure cuff and pulse oximeter.   Pt incontinent of stool. Perineal care given. Linens changed. Pt placed in gown. Rectal temp taken. MD notified.

## 2019-10-10 PROBLEM — E43 SEVERE MALNUTRITION: Status: ACTIVE | Noted: 2019-10-10

## 2019-10-10 LAB
ANION GAP SERPL CALC-SCNC: 10 MMOL/L (ref 8–16)
BACTERIA #/AREA URNS HPF: ABNORMAL /HPF
BASOPHILS # BLD AUTO: 0.03 K/UL (ref 0–0.2)
BASOPHILS NFR BLD: 0.3 % (ref 0–1.9)
BILIRUB UR QL STRIP: NEGATIVE
BUN SERPL-MCNC: 14 MG/DL (ref 8–23)
CALCIUM SERPL-MCNC: 9.2 MG/DL (ref 8.7–10.5)
CHLORIDE SERPL-SCNC: 102 MMOL/L (ref 95–110)
CLARITY UR: CLEAR
CO2 SERPL-SCNC: 29 MMOL/L (ref 23–29)
COLOR UR: ABNORMAL
CREAT SERPL-MCNC: 0.7 MG/DL (ref 0.5–1.4)
DIFFERENTIAL METHOD: ABNORMAL
EOSINOPHIL # BLD AUTO: 0.1 K/UL (ref 0–0.5)
EOSINOPHIL NFR BLD: 0.8 % (ref 0–8)
ERYTHROCYTE [DISTWIDTH] IN BLOOD BY AUTOMATED COUNT: 13.4 % (ref 11.5–14.5)
EST. GFR  (AFRICAN AMERICAN): >60 ML/MIN/1.73 M^2
EST. GFR  (NON AFRICAN AMERICAN): >60 ML/MIN/1.73 M^2
GLUCOSE SERPL-MCNC: 86 MG/DL (ref 70–110)
GLUCOSE UR QL STRIP: NEGATIVE
HCT VFR BLD AUTO: 39.6 % (ref 40–54)
HGB BLD-MCNC: 12.9 G/DL (ref 14–18)
HGB UR QL STRIP: NEGATIVE
KETONES UR QL STRIP: ABNORMAL
LEUKOCYTE ESTERASE UR QL STRIP: NEGATIVE
LYMPHOCYTES # BLD AUTO: 1.2 K/UL (ref 1–4.8)
LYMPHOCYTES NFR BLD: 13.4 % (ref 18–48)
MCH RBC QN AUTO: 30.1 PG (ref 27–31)
MCHC RBC AUTO-ENTMCNC: 32.6 G/DL (ref 32–36)
MCV RBC AUTO: 92 FL (ref 82–98)
MICROSCOPIC COMMENT: ABNORMAL
MONOCYTES # BLD AUTO: 0.7 K/UL (ref 0.3–1)
MONOCYTES NFR BLD: 8.4 % (ref 4–15)
NEUTROPHILS # BLD AUTO: 6.8 K/UL (ref 1.8–7.7)
NEUTROPHILS NFR BLD: 77.1 % (ref 38–73)
NITRITE UR QL STRIP: POSITIVE
PH UR STRIP: 6 [PH] (ref 5–8)
PLATELET # BLD AUTO: 221 K/UL (ref 150–350)
PMV BLD AUTO: 11.5 FL (ref 9.2–12.9)
POCT GLUCOSE: 76 MG/DL (ref 70–110)
POTASSIUM SERPL-SCNC: 4.6 MMOL/L (ref 3.5–5.1)
PROT UR QL STRIP: NEGATIVE
RBC # BLD AUTO: 4.29 M/UL (ref 4.6–6.2)
RBC #/AREA URNS HPF: 2 /HPF (ref 0–4)
SODIUM SERPL-SCNC: 141 MMOL/L (ref 136–145)
SP GR UR STRIP: 1.02 (ref 1–1.03)
SQUAMOUS #/AREA URNS HPF: 1 /HPF
URN SPEC COLLECT METH UR: ABNORMAL
UROBILINOGEN UR STRIP-ACNC: NEGATIVE EU/DL
WBC # BLD AUTO: 8.81 K/UL (ref 3.9–12.7)
WBC #/AREA URNS HPF: 1 /HPF (ref 0–5)

## 2019-10-10 PROCEDURE — 97802 MEDICAL NUTRITION INDIV IN: CPT

## 2019-10-10 PROCEDURE — 86580 TB INTRADERMAL TEST: CPT | Performed by: INTERNAL MEDICINE

## 2019-10-10 PROCEDURE — 81000 URINALYSIS NONAUTO W/SCOPE: CPT

## 2019-10-10 PROCEDURE — 36415 COLL VENOUS BLD VENIPUNCTURE: CPT

## 2019-10-10 PROCEDURE — 12000002 HC ACUTE/MED SURGE SEMI-PRIVATE ROOM

## 2019-10-10 PROCEDURE — 63600175 PHARM REV CODE 636 W HCPCS: Performed by: STUDENT IN AN ORGANIZED HEALTH CARE EDUCATION/TRAINING PROGRAM

## 2019-10-10 PROCEDURE — 97530 THERAPEUTIC ACTIVITIES: CPT

## 2019-10-10 PROCEDURE — 97535 SELF CARE MNGMENT TRAINING: CPT

## 2019-10-10 PROCEDURE — 87086 URINE CULTURE/COLONY COUNT: CPT

## 2019-10-10 PROCEDURE — 80048 BASIC METABOLIC PNL TOTAL CA: CPT

## 2019-10-10 PROCEDURE — 97166 OT EVAL MOD COMPLEX 45 MIN: CPT

## 2019-10-10 PROCEDURE — 92610 EVALUATE SWALLOWING FUNCTION: CPT

## 2019-10-10 PROCEDURE — 30200315 PPD INTRADERMAL TEST REV CODE 302: Performed by: INTERNAL MEDICINE

## 2019-10-10 PROCEDURE — 85025 COMPLETE CBC W/AUTO DIFF WBC: CPT

## 2019-10-10 PROCEDURE — 25000003 PHARM REV CODE 250: Performed by: STUDENT IN AN ORGANIZED HEALTH CARE EDUCATION/TRAINING PROGRAM

## 2019-10-10 RX ADMIN — TUBERCULIN PURIFIED PROTEIN DERIVATIVE 5 UNITS: 5 INJECTION, SOLUTION INTRADERMAL at 04:10

## 2019-10-10 RX ADMIN — ENOXAPARIN SODIUM 40 MG: 100 INJECTION SUBCUTANEOUS at 04:10

## 2019-10-10 RX ADMIN — MEMANTINE HYDROCHLORIDE 10 MG: 5 TABLET ORAL at 10:10

## 2019-10-10 RX ADMIN — CEFTRIAXONE 2 G: 2 INJECTION, SOLUTION INTRAVENOUS at 09:10

## 2019-10-10 RX ADMIN — DONEPEZIL HYDROCHLORIDE 10 MG: 5 TABLET, FILM COATED ORAL at 10:10

## 2019-10-10 RX ADMIN — MEMANTINE HYDROCHLORIDE 10 MG: 5 TABLET ORAL at 09:10

## 2019-10-10 NOTE — PLAN OF CARE
Recommendation:   1. Boost Plus BID (breakfast + dinner)   2. Encourage intake at meals as tolerated    Goals: PO to meet 85% of needs

## 2019-10-10 NOTE — PROGRESS NOTES
Pharmacy New Medication Education    Patient and/or Caregiver ACCEPTED medication education.    Pharmacy has provided education on the name, indication, and possible side effects of the medication(s) prescribed, using teach-back method.     Learners of pharmacy medication education includes:  patient    Medication Indication Side Effects   rocephin uti headache and rash   enoxaparin DVT ppx bruising and bleeding       The following medications have also been discussed, during this admission.     Current Facility-Administered Medications   Medication Frequency    acetaminophen tablet 650 mg Q4H PRN    cefTRIAXone (ROCEPHIN) 2 g in dextrose 5 % 50 mL IVPB Q24H    donepezil tablet 10 mg Daily    enoxaparin injection 40 mg Daily    memantine tablet 10 mg BID    ondansetron injection 4 mg Q8H PRN    sodium chloride 0.9% flush 10 mL PRN    tuberculin injection 5 Units Once          Thank you  Luther Cason, PharmD

## 2019-10-10 NOTE — PLAN OF CARE
Pt stable. NO distress noted. POC reviewed with pt and spouse. Spouse verbalized understanding. Pt remains SR with PVCs on the monitor. Iv antibiotics administered. Pt was afebrile. Pt has a productive cough with thick yellow sputum noted. New IV access placed to RFA. NO true red alarms noted. No signs of pain noted. Condom cath clamped to obtain urine specimen. Fall precautions maintained. Bed in lowest position, call light in reach and bed alarm on.

## 2019-10-10 NOTE — ASSESSMENT & PLAN NOTE
Malnutrition in the context of Chronic Illness/Injury    Related to (etiology):  Alzheimers    Signs and Symptoms (as evidenced by):  Energy Intake: <50% of estimated energy requirement for 3 years  Body Fat Depletion: severe depletion of orbitals and triceps   Muscle Mass Depletion: severe depletion of temples, clavicle region and lower extremities   Weight Loss: 26% x 3 years   Fluid Accumulation: mild    Interventions/Recommendations (treatment strategy):  Com[employer]     Nutrition Diagnosis Status:  New

## 2019-10-10 NOTE — PROGRESS NOTES
The Sw spoke to the pt's dtr Ynes([phone])who states she's agreeable to the recs therapy suggested for snf for the pt. She will arrive at the hospital in about 2 hours. The Sw will give the snf list to the pt's wife who's in the room. Ynes states she would prefer the pt go to a snf in the [facility] area. The Sw explained to her the snf must be approved by [facility] and the dtr acknowledged understanding. She states the pt went home with hh before due to her mother not agreeing with snf. She feels the pt will require therapy in a controlled environment and hh will just not cut it b/c her mother babies the pt too much. Shelly([place])faxed the snf consult to [facility]. The Sw left a message for Lorie at [facility] informing her of the above mentioned info.

## 2019-10-10 NOTE — PT/OT/SLP EVAL
Occupational Therapy   Evaluation/Treatment    Name: Héctor Harvey  MRN: [id number]  Admitting Diagnosis:  Sepsis    The primary encounter diagnosis was Acute cystitis with hematuria. Diagnoses of Sepsis, Weakness, Sepsis secondary to UTI, and Acute cystitis without hematuria were also pertinent to this visit.    Recommendations:     Discharge Recommendations: nursing facility, skilled  Discharge Equipment Recommendations:  (TBD)  Barriers to discharge:  Decreased caregiver support    Assessment:     Héctor Harvey is a 79 y.o. male with a medical diagnosis of Sepsis.  He presents with poor balance, poor cognition, hx ALZ.  Wife says pt not at his baseline for mobility and transfers. Performance deficits affecting function: weakness, impaired self care skills, impaired balance, decreased coordination, decreased safety awareness, decreased upper extremity function, decreased lower extremity function, impaired cognition, gait instability, impaired functional mobilty, impaired endurance, abnormal tone.      Rehab Prognosis: Fair; patient would benefit from acute skilled OT services to address these deficits and reach maximum level of function.       Plan:     Patient to be seen 5 x/week to address the above listed problems via self-care/home management, neuromuscular re-education, therapeutic activities, therapeutic exercises  · Plan of Care Expires:    · Plan of Care Reviewed with: patient, spouse    Subjective     Chief Complaint: none  Patient/Family Comments/goals: to go to SNF for 2 week.    Occupational Profile:  Living Environment: pt lives with wife in [place]; 1STE. Per daughter pt has sitter 3/wk 4hrs; plan is to increase it to 5days/4hrs per week.(per PT eval report)  Previous level of function: wife reported pt was able to feed himself, ambulated with HHA at times but could usually walk without her help;noncompliant with use of RW; has lift chair at home for assist with sit to stand or she helps him with HHA.   He required total assist for bathing and dressing. Pt.could stand and sit for her to clean him after after incontinent episode.Wife does all IADLS.  Roles and Routines: sedentary  Equipment Used at Home:  wheelchair, bedside commode, shower chair, walker, rolling     Assistance upon Discharge: wife and pt. has sitter 3/wk 4hrs; plan is to increase it to 5days/4hrs per week (per PT eval report)    Pain/Comfort:  · Pain Rating 1: 0/10  · Pain Rating Post-Intervention 1: 0/10    Patients cultural, spiritual, Oriental orthodox conflicts given the current situation: no    Objective:     Communicated with: nurse prior to session.  Patient found HOB elevated with bed alarm, telemetry, Condom Catheter upon OT entry to room.    General Precautions: Standard, aspiration, fall   Orthopedic Precautions:N/A   Braces: N/A     Occupational Performance:    Bed Mobility:    · Patient completed Rolling/Turning to Left with  minimum assistance and with side rail  · Patient completed Scooting/Bridging with maximal assistance  · Patient completed Supine to Sit with moderate assistance    Functional Mobility/Transfers:  · Patient completed Sit <> Stand Transfer with moderate assistance and of 2 persons  with  hand-held assist and to block feet from sliding forwared, correct trunk alignment back to midline 2/2 left leaning /flexion.   · Patient completed Bed <> Chair Transfer using Step Transfer technique with moderate assistance and of 2 persons with hand-held assist and same as stated above  · Functional Mobility: ambulated short distance in room bed<>window with mod assist x 2 with hand in hand assist; posterior leaning to left, shuffling feet, poor balance and max vc for postural correction with minimal follow through     Activities of Daily Living:  · Feeding:  Stand by assistance seated BSC -heavy leaning and head laterally flexed to left side, unable to self correct.  · Lower Body Dressing: total assistance socks in bed for safety 2/2 LOB  sitting EOB  · Toileting: total assistance bed level; wearing condom catheter and brief; incontinent at home per wife but stands /sit for wife to safely clean him at home in bathroom.     Cognitive/Visual Perceptual:  Cognitive/Psychosocial Skills:     -       Oriented to: Person   -       Follows Commands/attention:Follows one-step commands 50%.  -       Communication: confused speech  -       Memory: Impaired STM, Impaired LTM and Poor immediate recall  -       Safety awareness/insight to disability: impaired   -       Mood/Affect/Coping skills/emotional control: Agitated  Visual/Perceptual:      -Intact .    Physical Exam:  Balance:    -       sitting:  poor-lean to left/trunk flexion to left, posterior LOB, rigid tone initially  Postural examination/scapula alignment:    -       Rounded shoulders  -       Forward head  -       Posterior pelvic tilt  -       Lateral weight shift of hips  -       Abnormal trunk flexion  -       Kyphosis  Dominant hand:    -       right  Upper Extremity Range of Motion:     -       Right Upper Extremity: WFL  -       Left Upper Extremity: WNL  Upper Extremity Strength:    -       Right Upper Extremity: pt not following instructions for MMT but able to raise arms  against gravity and squeeze OT's hand ; jorge alberto 3+/5-4-/5 strength  -       Left Upper Extremity: same as above   Strength: good -  Neurological: rigid tone     AMPAC 6 Click ADL:  AMPAC Total Score: 14    Treatment & Education:  Purpose of OT visit explained to pt and wife, pt not verbalizing understanding 2/2 confusion. Pt. Followed commands 50% during session. Wife instructed in chair positioning with pillows as lateral trunk supports to maximize midline trunk posture and legs elevated and back of chair slightly reclined to increased head upright position for safe feeding.  Head and trunk were still slightly laterally flexed to left but pt able to feed self with SBA with wife nearby.  Education:    Patient left up in  chair with all lines intact, call button in reach, chair alarm on and nurse notified and wife present.    GOALS:   Multidisciplinary Problems     Occupational Therapy Goals        Problem: Occupational Therapy Goal    Goal Priority Disciplines Outcome Interventions   Occupational Therapy Goal     OT, PT/OT Ongoing, Progressing    Description:  Goals to be met by: 10/30/2019    Patient will increase functional independence with ADLs by performing:    Stand with SBA using RW for periarea hygiene with 1 person assist.  Toilet transfer to toilet with Contact guard assistance.   Family will demonstrate independence and verbalized understanding re: pt assistance with ADLS  and transfers.                      History:     Past Medical History:   Diagnosis Date    Late onset Alzheimer's disease without behavioral disturbance 10/20/2016    Physical deconditioning 5/24/2019       Past Surgical History:   Procedure Laterality Date    COLONOSCOPY  2011       Time Tracking:     OT Date of Treatment: 10/10/19  OT Start Time: 1256  OT Stop Time: 1339  OT Total Time (min): 43 min    Billable Minutes:Evaluation 15  Self Care/Home Management 8  Total Time 23    Tamera Stone OT  10/10/2019

## 2019-10-10 NOTE — PT/OT/SLP EVAL
Speech Language Pathology Evaluation  Bedside Swallow    Patient Name:  Héctor Harvey   MRN:  [id number]  Admitting Diagnosis: Sepsis    Recommendations:                 General Recommendations:  Ongoing swallow assessment, Dietary consult  Diet recommendations:  Mechanical soft, Thin   Aspiration Precautions: 1:1 assist with intake, monitor for any overt coughing/choking or wet/gurgly voice, standard aspiration precautions, small bites/sips, alternate bites/sips, watch for pocketing/oral residue, HOB at 90* for intake, and remain upright at 90* at least 30 mins s/p meal   General Precautions: Standard, aspiration, fall  Communication strategies:  simple, concrete information only    History:     History of Present Illness:  Héctor Harvey is a 79 y.o.  male who  has a past medical history of Late onset Alzheimer's disease without behavioral disturbance (10/20/2016) and Physical deconditioning (5/24/2019).. The patient presented to [facility] on 10/8/2019 with a primary complaint of Weakness (weakness and fatigue with ams per family. patients family states patient has hx of dementia has been generally weak and fatigued today family states similar symptoms when patient had uti few months ago )        The patient was in their usual state of health until 1 x week ago when he there was a change in his ADL's including fatigue, talking less, decreased appetite, decrease in walking(normally walks at the house). 1 day ago, his symptoms worsened to wear he did not have the strength to walk to the shower and was more confused without talking to anyone in the household as well as unable to feed himself. Similar symptoms occurred when he had a UTI 5 x months ago. Relative denies pt experiencing chest pain, SOB, falls, abd pain, nausea, vomiting, diarrhea, or dysuria.      Past Medical History:   Diagnosis Date    Late onset Alzheimer's disease without behavioral disturbance 10/20/2016    Physical  deconditioning 5/24/2019       Past Surgical History:   Procedure Laterality Date    COLONOSCOPY  2011       Social History: Patient lives with his wife, Estrellita.    Prior Intubation HX:  None this admit.    Modified Barium Swallow: None per EMR.    Chest X-Rays:   Impression:       Questionable retrocardiac opacity seen within the left lower lung zone which could reflect aspiration or developing pneumonia in the right clinical setting.       Prior diet: Regular/Thin liquids given wife chopping into small pieces.    Subjective     Pt seen at the bedside for clinical swallow assessment. SLP did check w/ RNShae, prior to visit. Pt awake/alert and agreeable to SLP entry. Wife, Estrellita, at the bedside. Pt pleasant though confused.   Patient goals: Unintelligible utterances     Pain/Comfort:  · Pain Rating 1: 0/10    Objective:     Oral Musculature Evaluation  · Oral Musculature: general weakness  · Dentition: present and adequate  · Secretion Management: adequate  · Mucosal Quality: good  · Mandibular Strength and Mobility: WFL  · Oral Labial Strength and Mobility: impaired pursing, functional retraction, functional seal  · Lingual Strength and Mobility: impaired strength  · Velar Elevation: WFL  · Buccal Strength and Mobility: decreased tone  · Volitional Cough: (unable to follow command)  · Volitional Swallow: (unable to follow command)  · Voice Prior to PO Intake: (clear)  · Oral Musculature Comments: (cognitive impairment did limit oral mechanism exam)    Bedside Swallow Eval: HOB elevated to 90*. SLP provided total assist with initial PO trials; however, as trials lengthened pt required less assist. Reduced proprioception/visual deficits required cuing to locate PO trial.   Consistencies Assessed:  · Thin liquids : straw sips water x4  · Puree : tsp bites applesauce x3  · Soft solids : tsp bites diced peaches x3     Oral Phase:   · spitting out of oral residue x1  · Excess chewing  · Prolonged mastication  · Oral  residue: mild s/p soft solids  · Slow oral transit time    Pharyngeal Phase:   · delayed coughing s/p completion of PO trials  · delayed swallow initiation: mild  · multiple spontaneous swallows: required 4 swallows for small sip thin liquid  · throat clearing    Compensatory Strategies  · Slow rate, pinched straw sips    Treatment: Pt will benefit from SLP tx 3x a week while inpatient to monitor diet tolerance and modify diet as needed.     Assessment:     Héctor Harvey is a 79 y.o. male with an admit diagnosis of Sepsis. SLP consulted to evaluate swallow function. Per clinical swallow assessment, pt presents with mild oropharyngeal dysphagia complicated by underlying ALZ. Pt deemed safe for oral diet of Mechanical Soft/Thin liquids given standard aspiration precautions and 1:1 assist. Meds to be given crushed in applesauce/pudding. Recs reviewed with pt, wife, RN, and MD team. SLP to f/u while inpatient.     Goals:   Multidisciplinary Problems     SLP Goals        Problem: SLP Goal    Goal Priority Disciplines Outcome   SLP Goal     SLP Ongoing, Progressing   Description:  Short Term Goals:  1. Pt will participate in clinical swallow assessment to determine LRD.-ongoing  2. Pt will consume Mechanical Soft/Thin liquid diet without any overt s/s of aspiration.                     Plan:     · Patient to be seen:  3 x/week   · Plan of Care expires:  11/08/19  · Plan of Care reviewed with:  patient, spouse(JAY Kaufman and MD team)   · SLP Follow-Up:  Yes       Discharge recommendations:  nursing facility, skilled   Barriers to Discharge:  None    Time Tracking:     SLP Treatment Date:   10/10/19  Speech Start Time:  1036  Speech Stop Time:  1055     Speech Total Time (min):  19 min    Billable Minutes: Eval Swallow and Oral Function 19 mins    Jessica Ford CCC-PATRICIA  10/10/2019

## 2019-10-10 NOTE — PT/OT/SLP PROGRESS
Physical Therapy Treatment    Patient Name:  Héctor Harvey   MRN:  [id number]    Recommendations:     Discharge Recommendations:  nursing facility, skilled   Discharge Equipment Recommendations: (defer to SNF)   Barriers to discharge: Decreased caregiver support and impaired functional mobility    Assessment:     Héctor Harvey is a 79 y.o. male admitted with a medical diagnosis of Sepsis.  He presents with the following impairments/functional limitations:  weakness, impaired endurance, impaired self care skills, impaired functional mobilty, gait instability, impaired balance, impaired cognition, decreased coordination, decreased upper extremity function, decreased lower extremity function, decreased safety awareness, decreased ROM, abnormal tone Pt would continue to benefit from P.T. To address impairments listed above.  .    Rehab Prognosis: Fair; patient would benefit from acute skilled PT services to address these deficits and reach maximum level of function.    Recent Surgery: * No surgery found *      Plan:     During this hospitalization, patient to be seen 6 x/week to address the identified rehab impairments via gait training, therapeutic activities, therapeutic exercises, neuromuscular re-education and progress toward the following goals:    · Plan of Care Expires:  11/09/19    Subjective       Patient/Family Comments/goals: Pt and pt's wife agreed to tx.  Pain/Comfort:  · Pain Rating 1: 0/10  · Pain Rating Post-Intervention 1: 0/10      Objective:     Communicated with RN  prior to session.  Patient found supine with bed alarm, telemetry, Condom Catheter upon PT entry to room.     General Precautions: Standard, fall   Orthopedic Precautions:N/A   Braces:       Functional Mobility:  · Bed Mobility:     · Rolling Left:  minimum assistance, moderate assistance and with b/r  · Scooting: moderate assistance and EOB  · Supine to Sit: moderate assistance  · Transfers:     · Sit to Stand:  moderate assistance, of  2 persons and x 3 bouts with hand-held assist  · Gait: 12ft x 2 with Mod A of 2.  Pt ambulates with decreased edis,  a shuffle gait pattern,  And feet out in front with posterior lean.  · Balance: sitting poor-, standing poor, gait poor      AM-PAC 6 CLICK MOBILITY  Turning over in bed (including adjusting bedclothes, sheets and blankets)?: 2  Sitting down on and standing up from a chair with arms (e.g., wheelchair, bedside commode, etc.): 2  Moving from lying on back to sitting on the side of the bed?: 2  Moving to and from a bed to a chair (including a wheelchair)?: 2  Need to walk in hospital room?: 1  Climbing 3-5 steps with a railing?: 1  Basic Mobility Total Score: 10       Therapeutic Activities and Exercises:   sit > stand with PTA and OT blocking pt's feet to prevent feet from sliding forward during stand, trunk flexion, and posterior lean.  Static standing at window with pt's hands placed on widowsill x ~5 mins with gentle weight shifting to facilitate improved posture. Pt demonstrated improved posture, more upright with decreased posterior lean.  Transfer EOB > b/s chair with Mod A of 2 and ~5 small steps with decreased foot to floor clearance. Pt sits with left lean and was positioned in b/s chair with pillows on left side to assist with midline posture.  Pt was left with his wife in room and lunch tray in front, and able to feed himself with supervision.    Patient left up in chair with all lines intact, call button in reach, chair alarm on and RN notified..    GOALS:   Multidisciplinary Problems     Physical Therapy Goals        Problem: Physical Therapy Goal    Goal Priority Disciplines Outcome Goal Variances Interventions   Physical Therapy Goal     PT, PT/OT Ongoing, Progressing     Description:  Goals to be met by:2019     Patient will increase functional independence with mobility by performin. Supine to sit with Set-up [place]  2. Sit to stand transfer with Contact Guard  Assistance  3. Bed to chair transfer with Contact Guard Assistance using Rolling Walker  4. Gait  x 20 feet with Contact Guard Assistance using Rolling Walker.   5. Lower extremity exercise program x15 reps per handout, with assistance as needed                      Time Tracking:     PT Received On: 10/10/19  PT Start Time: 1256     PT Stop Time: 1339  PT Total Time (min): 43 min     Billable Minutes: Therapeutic Activity 20 co-tx with O.T. X 23      Treatment Type: Treatment  PT/PTA: PTA     PTA Visit Number: 1     Jessa Hoover PTA  10/10/2019

## 2019-10-10 NOTE — PLAN OF CARE
CM met with daughter Ynes in room.  Pt asleep.  Informed pt does not meet [facility] criteria for admission.  Also informed her that skilled nursing request has been routed to medical director for review.  Did notify her of possibility of skilled denial as pt is unable to take director for effective therapy.  Decision will be made by tomorrow.      In event pt is denied skilled, Ynes ewing family will choose home health with sitter.  [facility] [place] [facility] on Monday for pt but presented for admission on Tuesday.  CM educated daughter on Alzheimers Disease and it's coarse.  Encourage family and caregiver support groups, information given.  Daughter  pt has only see Neurologist Dr Lopez once but referred to movement disorder clinic which is scheduled end of month.      CM will f/u with daughter on tomorrow with medical director decision.  If medically stable and denied pt will d/c with home health and sitter.  family may benefit from palliative consult for disease and symptom management/support.  Family does not seem educated regarding dx.      Rhonda Adorno, RN    [phone]

## 2019-10-10 NOTE — PLAN OF CARE
Problem: SLP Goal  Goal: SLP Goal  Description  Short Term Goals:  1. Pt will participate in clinical swallow assessment to determine LRD.-ongoing  2. Pt will consume Mechanical Soft/Thin liquid diet without any overt s/s of aspiration.    Outcome: Ongoing, Progressing   10/10: Bedside swallow study completed. REC: Mechanical Soft/Thin liquid diet given 1:1 assist with feeding and monitoring for any overt s/s of aspiration. Dietary consult rec'd 2/2 hx of unintentional weight loss. Meds to be given crushed in puree. SLP to f/u while inpatient.  JOAN Odell, CCC-SLP

## 2019-10-10 NOTE — PLAN OF CARE
CM aware therapy recommending skilled nursing placement.  At baseline, pt ambulated at home without assistance but did require assistance with dressing, bathing, did self feed.  Pt does have dx of alzheimers disease.  SW spoke family and would like to pursue placement at [facility], will attempt to get backup choices as pt appears to be long term placement if family unable to manage care at home.      Rhonda Adorno, RN    [phone]

## 2019-10-10 NOTE — PROGRESS NOTES
"[facility] Medicine Progress Note    Primary Team: [facility] Hospitalist Team B  Attending Physician: Oh  Resident: Lara  Intern: Hari    Subjective:      Mr. Harvey was resting comfortably in bed with his wife at his side when we went to see him this morning. She states that the did well overnight and had no complaints. She notes that he has however had a cough which is intermittent w/ production of sputum. Denies any chest pain or SOB. No further issues at this time.     Objective:     Last 24 Hour Vital Signs:  BP  Min: 82/54  Max: 140/84  Temp  Av.4 °F (36.9 °C)  Min: 97.9 °F (36.6 °C)  Max: 99.4 °F (37.4 °C)  Pulse  Av.7  Min: 69  Max: 92  Resp  Av  Min: 16  Max: 26  SpO2  Av.6 %  Min: 92 %  Max: 98 %  Height  Av' 5" (165.1 cm)  Min: 5' 5" (165.1 cm)  Max: 5' 5" (165.1 cm)  Weight  Av.6 kg (85 lb 1.6 oz)  Min: 38.6 kg (85 lb 1.6 oz)  Max: 38.6 kg (85 lb 1.6 oz)  I/O last 3 completed shifts:  In: 1220 [IV Piggyback:1220]  Out: 400 [Urine:400]    Physical Examination:  General: Resting comfortably in bed, Alert, Oriented to self  HEENT: PERRL, EOMI, Moist mucus membranes w/ no erythema noted, Trachea midline, No facial asymmetry noted  Cards: RRR, No murmurs, rubs or gallops  Pulm: CTAB, No wheezes, rales or rhonchi  Abd: Soft, NTD, NBS, Nondistended  Skin: No rashes or erythema noted, No ecchymosis  Ext: No edema, 2+ Distal pulses    Laboratory:  Laboratory Data Reviewed: yes  Pertinent Findings:  Recent Labs   Lab 10/08/19  2140 10/09/19  0930 10/10/19  0425   WBC 14.64* 10.41 8.81   HGB 14.7 12.0* 12.9*   HCT 44.3 36.8* 39.6*    197 221    141 141   K 4.7 4.3 4.6    108 102   CREATININE 0.8 0.7 0.7   BUN 19 16 14   CO2 26 26 29   ALT 6*  --   --    AST 12  --   --      Microbiology Data Reviewed: yes  Pertinent Findings:  Influenza A&B Negative  Blood Cultures(10/8)  NGTD  Urine Culture Pending    Other Results:  EKG (my interpretation): Normal sinus " rhythm, Criteria for right atrial enlargement, Twave abnormalities noted in lateral leads, No acute change noted from previous EKG    Radiology Data Reviewed: yes  Pertinent Findings:  Retroperitoneal Ultrasound Pending    Current Medications:     Infusions:       Scheduled:   cefTRIAXone (ROCEPHIN) IVPB  2 g Intravenous Q24H    donepezil  10 mg Oral Daily    enoxaparin  40 mg Subcutaneous Daily    memantine  10 mg Oral BID        PRN:  acetaminophen, ondansetron, sodium chloride 0.9%    Antibiotics and Day Number of Therapy:  Ceftriaxone 10/8 - Present    Assessment:     Héctor Harvey is a 79 y.o.male with  Patient Active Problem List    Diagnosis Date Noted    Sepsis 10/09/2019    Sepsis secondary to UTI 10/09/2019    Weakness     Vitamin D deficiency 05/25/2019    Acute encephalopathy 05/24/2019    UTI (urinary tract infection) 05/24/2019    Volume depletion 05/24/2019    Physical deconditioning 05/24/2019    Urinary tract infection without hematuria 05/24/2019    Cerebral ventriculomegaly 05/24/2019    Late onset Alzheimer's disease without behavioral disturbance 10/20/2016    Gilbert's disease 06/30/2016     Plan:     1. Sepsis secondary to UTI:  - Azithromycin 500 in the ED  - .9% Nacl 500ml bolus given for lower BP at 83 systolic   - Lactate originally 3.3 on presentation; Trending down with most recent 2.3  - UA: Positive nitrites        - Influ A + B: Negative; Blood Cultures NGTD; Urine culture pending collection  - Will continue Ceftriaxone 2g Q24H     2. Alzheimer's  - Will continue home Donepezil 10mg QD and Memantine 10mg BID     Diet: NPO Pending speech evaluation  PPx: Enoxaparin  Code status: Full  Dispo: Pending symptomatic improvement and deescaltion of antibiotic therapy    Yasmany Noriega MD  [facility] Internal Medicine HO-I  [facility] Hospitalist Team B    [facility] Medicine Hospitalist Pager numbers:   [facility] Hospitalist Medicine Team A (Jer/Sahara): [phone]  [facility] Hospitalist Medicine Team  B (Oh/Parminder):  [phone]

## 2019-10-10 NOTE — PLAN OF CARE
OT initial eval completed and treatment initiated.  Pt mod assist x 2 person assist for stand step t/f to chair; poor balance, poor cognition, hx ALZ.  Wife says pt not at his baseline for mobility and transfers. SNF recommended post acute. Continue with  OT POC..

## 2019-10-10 NOTE — CONSULTS
"  [facility]  Adult Nutrition  Consult Note    SUMMARY     Recommendations  Recommendation:   1. Boost Plus BID (breakfast + dinner)   2. Encourage intake at meals as tolerated    Goals: PO to meet 85% of needs    Nutrition Goal Status: new    Reason for Assessment  Reason For Assessment: consult  Diagnosis: (weakness)  Relevant Medical History: Alzheimers  General Information Comments: Pt family reports pt has never been a heavy eater, he has always been small (), and is given Ensure and Pedialyte at home. Pt tray shows appx <50%. NFPE 10/10 - severe orbital, clavical, arms, and legs; moderate temples  Nutrition Discharge Planning: Too soon to determine    Nutrition Risk Screen  Nutrition Risk Screen: no indicators present    Nutrition/Diet History  Spiritual, Cultural Beliefs, Moravian Practices, Values that Affect Care: no    Anthropometrics  Temp: 96.1 °F (35.6 °C)  Height Method: Stated  Height: 5' 5" (165.1 cm)  Height (inches): 65 in  Weight Method: Bed Scale  Weight: 38.6 kg (85 lb 1.6 oz)  Weight (lb): 85.1 lb  Ideal Body Weight (IBW), Male: 136 lb  % Ideal Body Weight, Male (lb): 62.57 lb  BMI (Calculated): 14.2  Usual Body Weight (UBW), k.2 kg  % Usual Body Weight: 74.1  % Weight Change From Usual Weight: -26.05 %    Lab/Procedures/Meds  Pertinent Labs Reviewed: reviewed  Pertinent Labs Comments: Ht 39.6, Hg 12.9  Pertinent Medications Reviewed: reviewed  Pertinent Medications Comments: enoxaparin    Estimated/Assessed Needs  Weight Used For Calorie Calculations: 61.7 kg (135 lb 15.7 oz)(IBW)  Energy Calorie Requirements (kcal): 1573.35  Energy Need Method: [place]-St Jeor  Protein Requirements: 74.17(1.2g)  Weight Used For Protein Calculations: 61.7 kg (135 lb 15.7 oz)(IBW)  Fluid Requirements (mL): 1573.35  Estimated Fluid Requirement Method: RDA Method  RDA Method (mL): 1573.35    Nutrition Prescription Ordered  Current Diet Order: Dysphagia Soft; Thin " Liquids  Nutrition Order Comments: Boost Plus should be added bid    Evaluation of Received Nutrient/Fluid Intake  % Intake of Estimated Energy Needs: 25 - 50 %  % Meal Intake: 25 - 50 %    Nutrition Risk  Level of Risk/Frequency of Follow-up: (f/u 2x/wk)     Assessment and Plan  Severe malnutrition  Malnutrition in the context of Chronic Illness/Injury    Related to (etiology):  Alzheimers    Signs and Symptoms (as evidenced by):  Energy Intake: <50% of estimated energy requirement for 3 years  Body Fat Depletion: severe depletion of orbitals and triceps   Muscle Mass Depletion: severe depletion of temples, clavicle region and lower extremities   Weight Loss: 26% x 3 years   Fluid Accumulation: mild    Interventions/Recommendations (treatment strategy):  Commercial Beverage     Nutrition Diagnosis Status:  New    Monitor and Evaluation  Food and Nutrient Intake: energy intake, food and beverage intake  Food and Nutrient Adminstration: diet order  Anthropometric Measurements: weight, weight change  Biochemical Data, Medical Tests and Procedures: electrolyte and renal panel, gastrointestinal profile, glucose/endocrine profile, inflammatory profile, lipid profile  Nutrition-Focused Physical Findings: overall appearance     Malnutrition Assessment  Energy Intake (Malnutrition): less than or equal to 50% for greater than or equal to 1 month   Orbital Region (Subcutaneous Fat Loss): severe depletion  Upper Arm Region (Subcutaneous Fat Loss): severe depletion   Pentecostalism Region (Muscle Loss): moderate depletion  Clavicle Bone Region (Muscle Loss): severe depletion  Clavicle and Acromion Bone Region (Muscle Loss): severe depletion  Anterior Thigh Region (Muscle Loss): severe depletion  Posterior Calf Region (Muscle Loss): severe depletion   Subcutaneous Fat Loss (Final Summary): severe protein-calorie malnutrition  Muscle Loss Evaluation (Final Summary): severe protein-calorie malnutrition       Nutrition Follow-Up  RD  Follow-up?: Yes     Note completed by Alma Moreira ([facility] Dietetic Intern)    I certify that I, Soo Loredo RD, directed the dietetic intern in service delivery and guided them using my skilled judgment. As the cosigning dietitian, I have reviewed the dietetic interns documentation and am responsible for the treatment, assessment, and plan.

## 2019-10-10 NOTE — PLAN OF CARE
10/10/19 1310   Post-Acute Status   Post-Acute Authorization Placement  (snf)   Post-Acute Placement Status Pending State Direction  (the sw called the pt's Locet in to [employer] and faxed the PASRR to [facility]. )

## 2019-10-10 NOTE — PLAN OF CARE
Problem: Physical Therapy Goal  Goal: Physical Therapy Goal  Description  Goals to be met by:2019     Patient will increase functional independence with mobility by performin. Supine to sit with Set-up [place]  2. Sit to stand transfer with Contact Guard Assistance  3. Bed to chair transfer with Contact Guard Assistance using Rolling Walker  4. Gait  x 20 feet with Contact Guard Assistance using Rolling Walker.   5. Lower extremity exercise program x15 reps per handout, with assistance as needed     Outcome: Ongoing, Progressing   Continue working toward goals.

## 2019-10-11 VITALS
HEART RATE: 91 BPM | BODY MASS INDEX: 14.18 KG/M2 | SYSTOLIC BLOOD PRESSURE: 94 MMHG | OXYGEN SATURATION: 97 % | WEIGHT: 85.13 LBS | RESPIRATION RATE: 16 BRPM | HEIGHT: 65 IN | TEMPERATURE: 96 F | DIASTOLIC BLOOD PRESSURE: 60 MMHG

## 2019-10-11 PROBLEM — N39.0 SEPSIS SECONDARY TO UTI: Status: RESOLVED | Noted: 2019-10-09 | Resolved: 2019-10-11

## 2019-10-11 PROBLEM — A41.9 SEPSIS: Status: RESOLVED | Noted: 2019-10-09 | Resolved: 2019-10-11

## 2019-10-11 PROBLEM — A41.9 SEPSIS SECONDARY TO UTI: Status: RESOLVED | Noted: 2019-10-09 | Resolved: 2019-10-11

## 2019-10-11 LAB
ANION GAP SERPL CALC-SCNC: 11 MMOL/L (ref 8–16)
BACTERIA UR CULT: NO GROWTH
BASOPHILS # BLD AUTO: 0.02 K/UL (ref 0–0.2)
BASOPHILS NFR BLD: 0.2 % (ref 0–1.9)
BUN SERPL-MCNC: 9 MG/DL (ref 8–23)
CALCIUM SERPL-MCNC: 9.4 MG/DL (ref 8.7–10.5)
CHLORIDE SERPL-SCNC: 102 MMOL/L (ref 95–110)
CO2 SERPL-SCNC: 28 MMOL/L (ref 23–29)
CREAT SERPL-MCNC: 0.6 MG/DL (ref 0.5–1.4)
DIFFERENTIAL METHOD: ABNORMAL
EOSINOPHIL # BLD AUTO: 0.1 K/UL (ref 0–0.5)
EOSINOPHIL NFR BLD: 0.8 % (ref 0–8)
ERYTHROCYTE [DISTWIDTH] IN BLOOD BY AUTOMATED COUNT: 13.3 % (ref 11.5–14.5)
EST. GFR  (AFRICAN AMERICAN): >60 ML/MIN/1.73 M^2
EST. GFR  (NON AFRICAN AMERICAN): >60 ML/MIN/1.73 M^2
GLUCOSE SERPL-MCNC: 89 MG/DL (ref 70–110)
HCT VFR BLD AUTO: 42.8 % (ref 40–54)
HGB BLD-MCNC: 14.4 G/DL (ref 14–18)
LYMPHOCYTES # BLD AUTO: 1.2 K/UL (ref 1–4.8)
LYMPHOCYTES NFR BLD: 13.7 % (ref 18–48)
MCH RBC QN AUTO: 30.7 PG (ref 27–31)
MCHC RBC AUTO-ENTMCNC: 33.6 G/DL (ref 32–36)
MCV RBC AUTO: 91 FL (ref 82–98)
MONOCYTES # BLD AUTO: 0.7 K/UL (ref 0.3–1)
MONOCYTES NFR BLD: 7.5 % (ref 4–15)
NEUTROPHILS # BLD AUTO: 7 K/UL (ref 1.8–7.7)
NEUTROPHILS NFR BLD: 77.8 % (ref 38–73)
PLATELET # BLD AUTO: 235 K/UL (ref 150–350)
PMV BLD AUTO: 11.1 FL (ref 9.2–12.9)
POTASSIUM SERPL-SCNC: 4.5 MMOL/L (ref 3.5–5.1)
RBC # BLD AUTO: 4.69 M/UL (ref 4.6–6.2)
SODIUM SERPL-SCNC: 141 MMOL/L (ref 136–145)
WBC # BLD AUTO: 8.98 K/UL (ref 3.9–12.7)

## 2019-10-11 PROCEDURE — 63600175 PHARM REV CODE 636 W HCPCS: Performed by: STUDENT IN AN ORGANIZED HEALTH CARE EDUCATION/TRAINING PROGRAM

## 2019-10-11 PROCEDURE — 36415 COLL VENOUS BLD VENIPUNCTURE: CPT

## 2019-10-11 PROCEDURE — 97116 GAIT TRAINING THERAPY: CPT

## 2019-10-11 PROCEDURE — 97530 THERAPEUTIC ACTIVITIES: CPT

## 2019-10-11 PROCEDURE — 25000003 PHARM REV CODE 250: Performed by: STUDENT IN AN ORGANIZED HEALTH CARE EDUCATION/TRAINING PROGRAM

## 2019-10-11 PROCEDURE — 97535 SELF CARE MNGMENT TRAINING: CPT

## 2019-10-11 PROCEDURE — 80048 BASIC METABOLIC PNL TOTAL CA: CPT

## 2019-10-11 PROCEDURE — 85025 COMPLETE CBC W/AUTO DIFF WBC: CPT

## 2019-10-11 RX ORDER — AMOXICILLIN AND CLAVULANATE POTASSIUM 875; 125 MG/1; MG/1
1 TABLET, FILM COATED ORAL 2 TIMES DAILY
Qty: 8 TABLET | Refills: 0 | Status: SHIPPED | OUTPATIENT
Start: 2019-10-11 | End: 2019-10-15

## 2019-10-11 RX ADMIN — CEFTRIAXONE 2 G: 2 INJECTION, SOLUTION INTRAVENOUS at 01:10

## 2019-10-11 RX ADMIN — MEMANTINE HYDROCHLORIDE 10 MG: 5 TABLET ORAL at 08:10

## 2019-10-11 RX ADMIN — DONEPEZIL HYDROCHLORIDE 10 MG: 5 TABLET, FILM COATED ORAL at 08:10

## 2019-10-11 NOTE — DISCHARGE SUMMARY
[facility] Discharge Summary    Primary Team: [facility] Team B  Attending Physician: Thierry Casiano MD  Resident: Lara  Intern: Hari    Date of Admit: 10/8/2019  Date of Discharge: 10/11/2019    Discharge to: home  Condition: good    Discharge Diagnoses     Patient Active Problem List   Diagnosis    Gilbert's disease    Late onset Alzheimer's disease without behavioral disturbance    Acute encephalopathy    UTI (urinary tract infection)    Volume depletion    Physical deconditioning    Urinary tract infection without hematuria    Cerebral ventriculomegaly    Vitamin D deficiency    Sepsis    Sepsis secondary to UTI    Weakness    Severe malnutrition       Consultants and Procedures     Consultants:  None    Procedures:   None    Brief History of Present Illness      Héctor Harvey is a 79 y.o.  male who  has a past medical history of Late onset Alzheimer's disease without behavioral disturbance (10/20/2016) and Physical deconditioning (5/24/2019).  The patient presented to [facility] on 10/8/2019 with a primary complaint of Weakness (weakness and fatigue with ams per family. patients family states patient has hx of dementia has been generally weak and fatigued today family states similar symptoms when patient had uti few months ago )    The patient was in their usual state of health until 1 x week ago when he there was a change in his ADL's including fatigue, talking less, decreased appetite, decrease in walking(normally walks at the house). 1 day ago, his symptoms worsened to wear he did not have the strength to walk to the shower and was more confused without talking to anyone in the household as well as unable to feed himself. Similar symptoms occurred when he had a UTI 5 x months ago. Relative denies pt experiencing chest pain, SOB, falls, abd pain, nausea, vomiting, diarrhea, or dysuria.     For the full HPI please refer to the History & Physical from this  admission.    Hospital Course By Problem with Pertinent Findings     1. Sepsis secondary to UTI:  - Azithromycin 500 in the ED  - .9% Nacl 500ml bolus given for lower BP at 83 systolic   - Lactate originally 3.3 on presentation; Trending down with most recent 2.3  - UA: Positive nitrites        - Influ A + B: Negative; Blood Cultures NGTD; Urine culture pending  - continued Ceftriaxone 2g Q24H, discharged on Augmentin for a total 7-day course of treatment     2. Alzheimer's  - Will continue home Donepezil 10mg QD and Memantine 10mg BID  - Mental status improved with treatment, back at baseline per family     3. Deconditioning  - At baseline patient can ambulate without assistance and self feed; Required assistance w/ dressing and bathing  - As per OT/PT patient will benefit from SNF placement, not approved by patient's insurance. Will be discharged with home health, PT and OT.    Discharge Medications        Medication List      ASK your doctor about these medications    b complex vitamins tablet     cholecalciferol (vitamin D3) 1,000 unit capsule  Commonly known as:  VITAMIN D3  Take 1 capsule (1,000 Units total) by mouth once daily.     donepezil 10 MG tablet  Commonly known as:  ARICEPT  Take 1 tablet (10 mg total) by mouth once daily.     memantine 10 MG Tab  Commonly known as:  NAMENDA  Take 1 tablet (10 mg total) by mouth 2 (two) times daily.            Discharge Information:   Diet:  regular    Physical Activity:  As tolerated    Instructions:  1. Take all medications as prescribed  2. Keep all follow-up appointments  3. Return to the hospital or call your primary care physicians if any worsening symptoms such as fevers, worsening confusion, SOB, or any other concerning symptoms occur.    Follow-Up Appointments:  PCP in 1-2 weeks    Mercedes Bermudez MD  [facility] Internal Medicine/Emergency Medicine [facility]  10/11/2019 1:28 PM

## 2019-10-11 NOTE — PT/OT/SLP PROGRESS
Occupational Therapy   Treatment    Name: Héctor Harvey  MRN: [id number]  Admitting Diagnosis:  UTI (urinary tract infection)       Recommendations:     Discharge Recommendations: nursing facility, skilled(Patient denied SNF -- will need HH OT/PT)  Discharge Equipment Recommendations:  (Patient and family educated on gait belt use and provided with belt)  Barriers to discharge:  (Increased burden on CG)    Assessment:   Patient Max-TotalA for UB and LB dsg this date. Wife reports this is his baseline for dsg tasks. Patient /c nonsensical speech throughout session. Wife and daughter issued gait belt for home use and educated on proper donning/doffing and wear techniques.     Héctor Harvey is a 79 y.o. male with a medical diagnosis of UTI (urinary tract infection).Performance deficits affecting function are weakness, impaired endurance, impaired self care skills, impaired functional mobilty, gait instability, impaired balance, impaired cognition, decreased upper extremity function, decreased lower extremity function, decreased coordination, decreased safety awareness, decreased ROM, impaired coordination, abnormal tone.     Rehab Prognosis:  Poor; patient would benefit from acute skilled OT services to address these deficits and reach maximum level of function.       Plan:     Patient to be seen 5 x/week to address the above listed problems via self-care/home management, therapeutic activities, therapeutic exercises  · Plan of Care Expires:    · Plan of Care Reviewed with: patient, spouse, daughter    Subjective     Pain/Comfort:  · Pain Rating 1: (did not rate)    Objective:     Communicated with: Corinna monique prior to session.  Patient found up in chair with telemetry, Condom Catheter(chair alarm) upon OT entry to room.    General Precautions: Standard, aspiration, fall   Orthopedic Precautions:N/A   Braces: N/A     Bed Mobility:    · N/A - patient in b/s chair    Functional Mobility/Transfers:  · Patient  completed Sit <> Stand Transfer with moderate assistance and maximal assistance  with  rolling walker     Activities of Daily Living:  · Upper Body Dressing: maximal assistance attempted to thread RUE   · Lower Body Dressing: maximal assistance and total assistance    · Toileting: total assistance      [facility] 6 Click ADL: 9    Treatment & Education:  Patient asleep in b/s chair. TN present and informed family of SNF denial -- patient will go home /c HH OT/PT. ADLs as above. On standing patient demonstrates posterior lean/push requiring modA to maintain upright balance. Wife and spouse provided /c gait belt and instructed on proper use for safe transfers and ambulation in the home. Wife and daughter verbalized understanding.     Patient left up in chair with all lines intact, call button in reach, nsg notified and wife and daughter presentEducation:      GOALS:   Multidisciplinary Problems     Occupational Therapy Goals        Problem: Occupational Therapy Goal    Goal Priority Disciplines Outcome Interventions   Occupational Therapy Goal     OT, PT/OT     Description:  Goals to be met by: 10/30/2019    Patient will increase functional independence with ADLs by performing:    Stand with SBA using RW for periarea hygiene with 1 person assist.  Toilet transfer to toilet with Contact guard assistance.   Family will demonstrate independence and verbalized understanding re: pt assistance with ADLS  and transfers.                       Time Tracking:     OT Date of Treatment: 10/11/19  OT Start Time: 1445  OT Stop Time: 1523  OT Total Time (min): 38 min    Billable Minutes:Self Care/Home Management 23  Therapeutic Activity 15    MARVIN Roman  10/11/2019

## 2019-10-11 NOTE — NURSING
AVS delivered to patient with daughter and spouse at bedside. AVS thoroughly explained to patient and spouse with all meds explained and reviewed. All meds explained with reason, dose, freq, side effects. All questions answered with no further questions at this time. IV discontinued. Pt currently waiting on medication delivery.

## 2019-10-11 NOTE — PLAN OF CARE
10/11/19 0939   Post-Acute Status   Post-Acute Authorization Placement  (snf)   Post-Acute Placement Status Pending Payor Medical Review  (PHN sent the pt's case to Medical Review)

## 2019-10-11 NOTE — PLAN OF CARE
D/C rounds complete. All questions answered.  Nurse to discuss d/c medications.  Discussed need to keep f/u appts, adherence to medication regimen for health maintenance, verbalized understanding.     10/11/19 9766   Final Note   Assessment Type Final Discharge Note   Anticipated Discharge Disposition Home-Health   Hospital Follow Up  Appt(s) scheduled? Yes   Discharge plans and expectations educations in teach back method with documentation complete? Yes   Right Care Referral Info   Post Acute Recommendation Home-care       Referral sent for home health to [facility], will await assignment of services.

## 2019-10-11 NOTE — PROGRESS NOTES
The Sw spoke to the pt's dtr Dominique via phone who states she understands [facility] snf denied the pt and she wants to see what the decision of the medical director for [facility] will be. She states they hired a lady to assist the pt and her mother and she feels comfortable if the pt has to d/c home but she still wants to see what PHN's decision will be. She thanked the Sw and Rhonda for their involvement. She's in agreement for hh if the pt's denied for snf via N.

## 2019-10-11 NOTE — PLAN OF CARE
Problem: Physical Therapy Goal  Goal: Physical Therapy Goal  Description  Goals to be met by:2019     Patient will increase functional independence with mobility by performin. Supine to sit with Set-up [place]  2. Sit to stand transfer with Contact Guard Assistance  3. Bed to chair transfer with Contact Guard Assistance using Rolling Walker  4. Gait  x 20 feet with Contact Guard Assistance using Rolling Walker.   5. Lower extremity exercise program x15 reps per handout, with assistance as needed      10/11/2019 1556 by Raissa Alberts, PTA  Outcome: Ongoing, Progressing  10/11/2019 1556 by Raissa Alberts, PTA  Reactivated   Pt continues to work toward goals.

## 2019-10-11 NOTE — PLAN OF CARE
CM received call from [facility], notified has been denied skilled nursing placement by insurance.  Met with pt and daughter in room, notified of denial and will opt to take pt home with home health.  Primary team to place orders, will send to [facility] for assignment of agency for tomorrow.         Discussed with daughter need to keep f/u with Neurology for continued management of disease process.  Daughter and wife will take pt home today.    Future Appointments   Date Time Provider Department Center   10/28/2019  3:20 PM Crissy Linares MD [facility]   10/29/2019  4:00 PM Ivette Shea MD [facility] NEURO [facility]         Rhonda Adorno, RN    [phone]

## 2019-10-11 NOTE — PLAN OF CARE
POC reviewed with the pt and spouse, verbalized understanding.  VSS.  Oriented to self only, incomprehensive words noted, reoriented pt during shift.  No complaint of pain or any other distress noted throughout night.  On continuous telemetry monitor, SR.  No ectopy noted.  IV antibiotic given.  Condom catheter in place.  BM x1 this early morning. Turned pt with pillow q2h.  Safety maintained, free of falls throughout shift.  Instructed to call for any assistance.  Will continue to monitor.

## 2019-10-11 NOTE — PT/OT/SLP PROGRESS
Physical Therapy Treatment    Patient Name:  Héctor Harvey   MRN:  [id number]    Recommendations:     Discharge Recommendations:  nursing facility, skilled   Discharge Equipment Recommendations: ( Defer to SNF)   Barriers to discharge:  Impaired functional mobility, decreased tolerance for all functional mobility    Assessment:     Héctor Harvey is a 79 y.o. male admitted with a medical diagnosis of UTI (urinary tract infection).  He presents with the following impairments/functional limitations:  weakness, impaired endurance, impaired self care skills, gait instability, impaired functional mobilty, impaired balance, impaired cognition, decreased coordination, decreased lower extremity function, decreased upper extremity function, decreased safety awareness, decreased ROM, impaired fine motor, abnormal tone. Pt performed ambulation training 2 trials ~8-10 ft and ~10 ft by 2 trials with hand held A of 2 people. Pt demonstrates shuffling steps throughout with an increased trunk flexion. Also with nonsensical talk throughout session. Recommending SNF upon discharge.    Rehab Prognosis: Fair; patient would benefit from acute skilled PT services to address these deficits and reach maximum level of function.    Recent Surgery: * No surgery found *      Plan:     During this hospitalization, patient to be seen 6 x/week to address the identified rehab impairments via gait training, therapeutic activities, therapeutic exercises, neuromuscular re-education and progress toward the following goals:    · Plan of Care Expires:  11/09/19    Subjective     Chief Complaint: None expressed  Patient/Family Comments/goals: None expressed  Pain/Comfort:  · Pain Rating 1: 0/10  · Pain Rating Post-Intervention 1: 0/10      Objective:     Communicated with  nurse prior to session.  Patient found supine with bed alarm, telemetry, Condom Catheter upon PT entry to room.     General Precautions: Standard, aspiration, fall   Orthopedic  Precautions:N/A   Braces: N/A     Functional Mobility:  · Bed Mobility:     · Rolling Left:  minimum assistance and moderate assistance  · Scooting: moderate assistance and  to EOB  · Supine to Sit: moderate assistance  · Transfers:     · Sit to Stand:  moderate assistance and of  2 persons with hand-held assist  · Gait:  2 trials ~8-10 ft and ~10 ft x 2 with HHA by 2 people      AM-PAC 6 CLICK MOBILITY  Turning over in bed (including adjusting bedclothes, sheets and blankets)?: 2  Sitting down on and standing up from a chair with arms (e.g., wheelchair, bedside commode, etc.): 2  Moving from lying on back to sitting on the side of the bed?: 2  Moving to and from a bed to a chair (including a wheelchair)?: 2  Need to walk in hospital room?: 2  Climbing 3-5 steps with a railing?: 1  Basic Mobility Total Score: 11       Therapeutic Activities and Exercises:   Pt with nonsensical talk throughout session. Able to perform ambulation training by 2 trials ~8-10 ft and ~10 ft x 2 with HHA x 2 people. Demonstrates shuffling of feet and an increased trunk flexion throughout. Stood at window ~30 seconds-1 minute with BUE's placed onto windowsill demonstrating an increased trunk flexion despite tactile ceuing. Wife requested pt sit in bedside chair post session. Chair alarm on. Gait belt issued to wife with demonstration of use.    Patient left up in chair with all lines intact, call button in reach, chair alarm on,  nurse notified and  wife present..    GOALS:   Multidisciplinary Problems     Physical Therapy Goals        Problem: Physical Therapy Goal    Goal Priority Disciplines Outcome Goal Variances Interventions   Physical Therapy Goal     PT, PT/OT Ongoing, Progressing     Description:  Goals to be met by:2019     Patient will increase functional independence with mobility by performin. Supine to sit with Set-up [place]  2. Sit to stand transfer with Contact Guard Assistance  3. Bed to chair transfer  with Contact Guard Assistance using Rolling Walker  4. Gait  x 20 feet with Contact Guard Assistance using Rolling Walker.   5. Lower extremity exercise program x15 reps per handout, with assistance as needed                       Time Tracking:     PT Received On: 10/11/19  PT Start Time: 0930     PT Stop Time: 0959  PT Total Time (min): 29 min     Billable Minutes: Gait Training  20 and Therapeutic Activity  9    Treatment Type: Treatment  PT/PTA: PTA     PTA Visit Number: 2     Raissa Alberts, PTA  10/11/2019

## 2019-10-11 NOTE — PROGRESS NOTES
"[facility] Medicine Progress Note    Primary Team: [facility] Hospitalist Team B  Attending Physician: Oh  Resident: Lara  Intern: Hari    Subjective:      Mr. Harvey was sleeping comfortably when I went to see him this morning with his wife at bedside. She states that he had no issues overnight; denying chest pain, SOB or abdominal discomfort. She states that he still remains not at his baseline. I discussed with her SNF placement and she is amenable.      Objective:     Last 24 Hour Vital Signs:  BP  Min: 98/57  Max: 133/90  Temp  Av.3 °F (36.3 °C)  Min: 96.1 °F (35.6 °C)  Max: 98.3 °F (36.8 °C)  Pulse  Av  Min: 68  Max: 90  Resp  Av  Min: 16  Max: 20  SpO2  Av.3 %  Min: 94 %  Max: 98 %  Height  Av' 5" (165.1 cm)  Min: 5' 5" (165.1 cm)  Max: 5' 5" (165.1 cm)  Weight  Av.6 kg (85 lb 1.6 oz)  Min: 38.6 kg (85 lb 1.6 oz)  Max: 38.6 kg (85 lb 1.6 oz)  I/O last 3 completed shifts:  In: -   Out: 400 [Urine:400]    Physical Examination:  General: Sleeping comfortably  HEENT: Moist mucus membranes w/ no erythema noted, Trachea midline, No facial asymmetry noted  Cards: RRR, No murmurs, rubs or gallops  Pulm: Clear to auscultation in anterior lung fields  Abd: Nondistended  Skin: No rashes or erythema noted, No ecchymosis  Ext: No edema    Laboratory:  Laboratory Data Reviewed: yes  Pertinent Findings:  Recent Labs   Lab 10/08/19  2140 10/09/19  0930 10/10/19  0425   WBC 14.64* 10.41 8.81   HGB 14.7 12.0* 12.9*   HCT 44.3 36.8* 39.6*    197 221    141 141   K 4.7 4.3 4.6    108 102   CREATININE 0.8 0.7 0.7   BUN 19 16 14   CO2 26 26 29   ALT 6*  --   --    AST 12  --   --      Microbiology Data Reviewed: yes  Pertinent Findings:  Influenza A&B Negative  Blood Cultures(10/8)  NGTD  Urine Culture Pending    Other Results:  EKG (my interpretation): Normal sinus rhythm, Criteria for right atrial enlargement, Twave abnormalities noted in lateral leads, No acute change noted " from previous EKG    Radiology Data Reviewed: yes  Pertinent Findings:  Retroperitoneal Ultrasound Pending    Current Medications:     Infusions:       Scheduled:   cefTRIAXone (ROCEPHIN) IVPB  2 g Intravenous Q24H    donepezil  10 mg Oral Daily    enoxaparin  40 mg Subcutaneous Daily    memantine  10 mg Oral BID        PRN:  acetaminophen, ondansetron, sodium chloride 0.9%    Antibiotics and Day Number of Therapy:  Ceftriaxone 10/8 - Present    Assessment:     Héctor Harvey is a 79 y.o.male with  Patient Active Problem List    Diagnosis Date Noted    Severe malnutrition 10/10/2019    Sepsis 10/09/2019    Sepsis secondary to UTI 10/09/2019    Weakness     Vitamin D deficiency 05/25/2019    Acute encephalopathy 05/24/2019    UTI (urinary tract infection) 05/24/2019    Volume depletion 05/24/2019    Physical deconditioning 05/24/2019    Urinary tract infection without hematuria 05/24/2019    Cerebral ventriculomegaly 05/24/2019    Late onset Alzheimer's disease without behavioral disturbance 10/20/2016    Gilbert's disease 06/30/2016     Plan:     1. Sepsis secondary to UTI:  - Azithromycin 500 in the ED  - .9% Nacl 500ml bolus given for lower BP at 83 systolic   - Lactate originally 3.3 on presentation; Trending down with most recent 2.3  - UA: Positive nitrites        - Influ A + B: Negative; Blood Cultures NGTD; Urine culture pending  - Will continue Ceftriaxone 2g Q24H     2. Alzheimer's  - Will continue home Donepezil 10mg QD and Memantine 10mg BID    3. Deconditioning  - At baseline patient can ambulate without assistance and self feed; Required assistance w/ dressing and bathing  - As per OT/PT patient will benefit from SNF placement  - Pending placement     Diet: Dysphagia Soft w/ Boost Supplementation  PPx: Enoxaparin  Code status: Full  Dispo: Pending SNF placement     Yasmany Noriega IV, MD  [facility] Internal Medicine HO-I  [facility] Hospitalist Team B    [facility] Medicine Hospitalist Pager numbers:    [facility] Hospitalist Medicine Team A (Jer/Sahara): [phone]  [facility] Hospitalist Medicine Team B (Oh/Parminder):  [phone]

## 2019-10-14 ENCOUNTER — PATIENT OUTREACH (OUTPATIENT)
Dept: ADMINISTRATIVE | Facility: CLINIC | Age: 79
End: 2019-10-14

## 2019-10-14 LAB
BACTERIA BLD CULT: NORMAL
BACTERIA BLD CULT: NORMAL

## 2019-10-14 NOTE — PHYSICIAN QUERY
PT Name: Héctor Harvey  MR #: [id number]     CDS/: Gauri Estrada RN CDI          Contact information: [email or web address]  This form is a permanent document in the medical record.     Query Date: October 14, 2019    Physician Query - Neurological Condition Clarification    By submitting this query, we are merely seeking further clarification of documentation to reflect the severity of illness of your patient. Please utilize your independent clinical judgment when addressing the question(s) below.    The Medical record reflects the following:     Indicators   Supporting Clinical Findings Location in Medical Record   X AMS, Confusion,  LOC, etc.  The patient was in their usual state of health until 1 x week ago when he there was a change in his ADL's including fatigue, talking less, decreased appetite, decrease in walking(normally walks at the house). 1 day ago, his symptoms worsened to wear he did not have the strength to walk to the shower and was more confused without talking to anyone in the household as well as unable to feed himself. Similar symptoms occurred when he had a UTI 5 x months ago.   Hospital medicine H&P  A Shilpa NEWELL / S Oh NEWELL   X Acute / Chronic Illness Sepsis secondary to UTI: UA: positive nitrates -   Blood culture no growth  Alzheimer's  - Will continue home Donepezil 10mg QD and Memantine 10mg BID  - Mental status improved with treatment, back at baseline per family  urine culture no growth   Discharge summary              Lab results    Radiology Findings      Electrolyte Imbalance      Medication      Treatment             X Other Deconditioning  - At baseline patient can ambulate without assistance and self feed; Required assistance w/ dressing and bathing   Discharge summary 10/11 128 pm   JENNIFER Bermudez MD / S Oh NEWELL     Encephalopathy- is a general term for any diffuse disease of the brain that alters brain function or structure. Treatment of the cognitive dysfunction varies but is  ultimately dependent on the treatment of the underlying condition.    Major Symptoms of Encephalopathy - Decreased level of consciousness, fluctuating alertness/concentration, confusion, agitation, lethargy, somnolence, drowsiness, obtundation, stupor, or coma.         References: National Institutes of Healths (NIH) National Circle of Neurological Disorders and Strokes;  HCPro 2016; Advisory Board     Clinical Guidelines:   These guidelines will set system standards to assist providers in managing, documentation, and coding of encephalopathy. The intent of this document is to serve as a system guideline, not replace the providers clinical judgment:  Provider, please specify the diagnosis or diagnoses associated with above clinical findings.  [ X  ] Metabolic Encephalopathy - Due to electrolye imbalance, metabolic derangements, or infections processes, includes Septic Encephalopathy     [   ] Other Encephalopathy - Includes uremic encephalopathy     [   ] Unspecified Encephalopathy        [   ] Other Neurological Condition-  Includes Post-ictal altered mental status. (please specify condition): _________     [   ]  Clinically Undetermined     Please document in your progress notes daily for the duration of treatment until resolved, and include in your discharge summary.

## 2019-10-14 NOTE — TELEPHONE ENCOUNTER
Informed  Rhonda Adorno, that pts heatherug has not heard from home health and wanted to know who and when will they be out. Was just informed by Rhonda, that [facility] will be is home health and they will be out to see pt tomorrow.. Rhonda to notify daug.

## 2019-10-14 NOTE — PATIENT INSTRUCTIONS
Bladder Infection, Male (Adult)    You have a bladder infection.  Urine is normally free of bacteria. But bacteria can get into the urinary tract from the skin around the rectum or it may travel in the blood from elsewhere in the body.  This is called a urinary tract infection (UTI). An infection can occur anywhere in the urinary tract. It could be in a kidney (pyelonephritis)or in the bladder (cystitis) and urethra (urethritis). The urethra is the tube that drains the urine from the bladder through the tip of the penis.  The most common place for a UTI is in the bladder. This is called a bladder infection. Most bladder infections are easily treated. They are not serious unless the infection spreads up to the kidney.  The terms bladder infection, UTI, and cystitis are often used to describe the same thing, but they arent always the same. Cystitis is an inflammation of the bladder. The most common cause of cystitis is an infection.   Keep in mind:  · Infections in the urine are called UTIs.  · Cystitis is usually caused by a UTI.  · Not all UTIs and cases of cystitis are bladder infections.  · Bladder infections are the most common type of cystitis.  Symptoms of a bladder infection  The infection causes inflammation in the urethra and bladder. This inflammation causes many of the symptoms. The most common symptoms of a bladder infection are:  · Pain or burning when urinating  · Having to go more often than usual  · Feeling like you need to go right away  · Only a small amount comes out  · Blood in urine  · Discomfort in your belly (abdomen), usually in the lower abdomen, above the pubic bone  · Cloudy, strong, or bad smelling urine  · Unable to urinate (retention)  · Urinary incontinence  · Fever  · Loss of appetite  Older adults may also feel confused.  Causes of a bladder infection  Bladder infections are not contagious. You can't get one from someone else, from a toilet seat, or from sharing a bath.  The most  common cause of bladder infections is bacteria from the bowels. The bacteria get onto the skin around the opening of the urethra. From there they can get into the urine and travel up to the bladder. This causes inflammation and an infection. This usually happens because of:  · An enlarged prostate  · Poor cleaning of the genitals  · Procedures that put a tube in your bladder, like a Galvez catheter  · Bowel incontinence  · Older age  · Not emptying your bladder (The urine stays there, giving the bacteria a chance to grow.)  · Dehydration (This allows urine to stay in the bladder longer.)  · Constipation (This can cause the bowels to push on the bladder or urethra and keep the bladder from emptying.)  Treatment  Bladder infections are treated with antibiotics. They usually clear up quickly without complications. Treatment helps prevent a more serious kidney infection.  Medicines  Medicines can help in the treatment of a bladder infection:  · You may have been given phenazopyridine to ease burning when you urinate. It will cause your urine to be bright orange. It can stain clothing.  · You may have been prescribed antibiotics. Take this medicine until you have finished it, even if you feel better. Taking all of the medicine will make sure the infection has cleared.  You can use acetaminophen or ibuprofen for pain, fever, or discomfort, unless another medicine was prescribed. You can also alternate them, or use both together. They work differently and are a different class of medicines, so taking them together is not an overdose. If you have chronic liver or kidney disease, talk with your healthcare provider before using these medicines. Also talk with your provider if youve had a stomach ulcer or GI bleeding or are taking blood thinner medicines.  Home care  Here are some guidelines to help you care for yourself at home:  · Drink plenty of fluids, unless your healthcare provider told you not to. Fluids will prevent  dehydration and flush out your bladder.  · Use good personal hygiene. Wipe from front to back after using the toilet, and clean your penis regularly. If you arent circumcised, retract the foreskin when cleaning.  · Urinate more frequently, and dont try to hold it in for long periods of time, if possible.  · Wear loose-fitting clothes and cotton underwear. Avoid tight-fitting pants. This helps keep you clean and dry.  · Change your diet to prevent constipation. This means eating more fresh foods and more fiber, and less junk and fatty foods.  · Avoid sex until your symptoms are gone.  · Avoid caffeine, alcohol, and spicy foods. These can irritate the bladder.  Follow-up care  Follow up with your healthcare provider, or as advised if all symptoms have not cleared up within 5 days. It is important to keep your follow-up appointment. You can talk with your provider to see if you need more tests of the urinary tract. This is especially important if you have infections that keep coming back.  If a culture was done, you will be told if your treatment needs to be changed. If directed, you can call to find out the results.  If X-rays were taken, you will be told of any findings that may affect your care.  Call 911  Call 911 if any of these occur:  · Trouble breathing  · Difficulty waking up  · Feeling confused  · Fainting or loss of consciousness  · Rapid heart rate  When to seek medical advice  Call your healthcare provider right away if any of these occur:  · Fever of 100.4ºF (38ºC) or higher, or as directed by your healthcare provider  · Your symptoms dont improve after 2 days of treatment  · Back or abdominal pain that gets worse  · Repeated vomiting, or you arent able to keep medicine down  · Weakness or dizziness  Date Last Reviewed: 10/1/2016  © 5721-9001 [employer]. [address] [place] [address], [place] [address]. All rights reserved. This information is not intended as a substitute for professional  medical care. Always follow your healthcare professional's instructions.

## 2019-10-15 ENCOUNTER — TELEPHONE (OUTPATIENT)
Dept: FAMILY MEDICINE | Facility: HOSPITAL | Age: 79
End: 2019-10-15

## 2019-10-15 PROCEDURE — G0180 MD CERTIFICATION HHA PATIENT: HCPCS | Mod: ,,, | Performed by: PSYCHIATRY & NEUROLOGY

## 2019-10-15 PROCEDURE — G0180 PR HOME HEALTH MD CERTIFICATION: ICD-10-PCS | Mod: ,,, | Performed by: PSYCHIATRY & NEUROLOGY

## 2019-10-15 NOTE — TELEPHONE ENCOUNTER
----- Message from Crissy Linares MD sent at 10/15/2019  2:38 PM CDT -----  Contact: Sridhar Castillo   [phone]  Looks like patient has an appt for 10/28.  Mucinex would be fine.    Thanks!    ----- Message -----  From: Belkis Wick LPN  Sent: 10/15/2019   1:53 PM CDT  To: Crissy Linares MD    Can I tell the daughter to get Mucinex or should he schedule an appointment?  ----- Message -----  From: Estrellita Nichols MA  Sent: 10/15/2019  12:50 PM CDT  To: [facility] Staff    Patient has flem; can you prescribe something to break it up or can they get otc?  Please advise.  Thanks.

## 2019-10-15 NOTE — NURSING
Spoke with Ms Ynes Harvey regarding  Father's cough. Per Dr Linares patient can take Mucinex OTC. Instructed Ms Harvey if cough does not get any better in a week call, call the office to a sooner appointment. Verbalized understanding.

## 2019-10-17 ENCOUNTER — TELEPHONE (OUTPATIENT)
Dept: HOME HEALTH SERVICES | Facility: HOSPITAL | Age: 79
End: 2019-10-17

## 2019-10-26 NOTE — PROGRESS NOTES
Subjective:       Patient ID: Héctor Harvey is a 79 y.o. male.    Chief Complaint: Hospital follow-up    Patient is a 78 yo male pmhx of late onset Alzheimer's disease presents today after recent hospitalization.  Patient presented to [facility] on 10/8/2019 with weakness, fatigue and found to have UTI.  He was admitted for 3 days and discharged to complete 7 day course of augmentin.  Family present reports patient has not returned to his baseline.  He has been minimally working with PT/OT at home.  He is unable to walk as of yet.  Wife reports good appetite.         Review of Systems   Unable to perform ROS: Dementia       Objective:      Vitals:    10/28/19 1536   BP: 91/63   Pulse: 79     Physical Exam   Constitutional: He appears well-developed and well-nourished. No distress.   HENT:   Head: Normocephalic and atraumatic.   Eyes: Pupils are equal, round, and reactive to light.   Cardiovascular: Normal rate, regular rhythm and normal heart sounds.   No murmur heard.  Pulmonary/Chest: Effort normal. No respiratory distress.   Abdominal: Soft.   Musculoskeletal: He exhibits no edema or tenderness.   Neurological: He is alert.   Skin: He is not diaphoretic.   Nursing note and vitals reviewed.      Assessment:       1. Late onset Alzheimer's disease without behavioral disturbance        Plan:       Late onset Alzheimer's disease without behavioral disturbance  -     Ambulatory referral to Home Health    Provided patient's family with handicap paperwork    Follow up in about 3 months (around 1/28/2020).

## 2019-10-28 ENCOUNTER — OFFICE VISIT (OUTPATIENT)
Dept: FAMILY MEDICINE | Facility: HOSPITAL | Age: 79
End: 2019-10-28
Attending: FAMILY MEDICINE
Payer: MEDICARE

## 2019-10-28 VITALS
HEIGHT: 65 IN | WEIGHT: 85 LBS | BODY MASS INDEX: 14.16 KG/M2 | DIASTOLIC BLOOD PRESSURE: 63 MMHG | SYSTOLIC BLOOD PRESSURE: 91 MMHG | HEART RATE: 79 BPM

## 2019-10-28 DIAGNOSIS — F02.80 LATE ONSET ALZHEIMER'S DISEASE WITHOUT BEHAVIORAL DISTURBANCE: Primary | ICD-10-CM

## 2019-10-28 DIAGNOSIS — G30.1 LATE ONSET ALZHEIMER'S DISEASE WITHOUT BEHAVIORAL DISTURBANCE: Primary | ICD-10-CM

## 2019-10-28 PROCEDURE — 99214 OFFICE O/P EST MOD 30 MIN: CPT | Performed by: STUDENT IN AN ORGANIZED HEALTH CARE EDUCATION/TRAINING PROGRAM

## 2019-10-28 RX ORDER — DOCUSATE SODIUM 100 MG/1
CAPSULE, LIQUID FILLED ORAL
COMMUNITY

## 2019-10-28 RX ORDER — DONEPEZIL HYDROCHLORIDE 10 MG/1
TABLET, FILM COATED ORAL
COMMUNITY
End: 2019-12-04

## 2019-10-28 RX ORDER — SULFAMETHOXAZOLE AND TRIMETHOPRIM 800; 160 MG/1; MG/1
TABLET ORAL
COMMUNITY
End: 2019-12-05

## 2019-11-07 ENCOUNTER — EXTERNAL HOME HEALTH (OUTPATIENT)
Dept: HOME HEALTH SERVICES | Facility: HOSPITAL | Age: 79
End: 2019-11-07
Payer: MEDICARE

## 2019-11-11 ENCOUNTER — TELEPHONE (OUTPATIENT)
Dept: HOME HEALTH SERVICES | Facility: HOSPITAL | Age: 79
End: 2019-11-11

## 2019-11-11 DIAGNOSIS — G30.9 ALZHEIMER'S DEMENTIA WITHOUT BEHAVIORAL DISTURBANCE, UNSPECIFIED TIMING OF DEMENTIA ONSET: ICD-10-CM

## 2019-11-11 DIAGNOSIS — F02.80 ALZHEIMER'S DEMENTIA WITHOUT BEHAVIORAL DISTURBANCE, UNSPECIFIED TIMING OF DEMENTIA ONSET: ICD-10-CM

## 2019-11-11 RX ORDER — MEMANTINE HYDROCHLORIDE 10 MG/1
10 TABLET ORAL 2 TIMES DAILY
Qty: 180 TABLET | Refills: 3 | Status: SHIPPED | OUTPATIENT
Start: 2019-11-11 | End: 2020-11-10

## 2019-11-19 ENCOUNTER — TELEPHONE (OUTPATIENT)
Dept: FAMILY MEDICINE | Facility: HOSPITAL | Age: 79
End: 2019-11-19

## 2019-11-19 NOTE — TELEPHONE ENCOUNTER
----- Message from Estrellita Nichols MA sent at 11/19/2019  9:00 AM CST -----  Contact: [facility]  [phone]  Cat  Need return call to discuss home health order from 10/28. States she has left prior messages and needs to discuss.  Thanks.

## 2019-11-19 NOTE — TELEPHONE ENCOUNTER
----- Message from Dianne Leon sent at 11/14/2019 10:54 AM CST -----  Cat from [facility] calling to ask about orders that were sent for OT and PT. Please call [phone]

## 2019-11-19 NOTE — NURSING
"Spoke with Cat @ [facility] regarding PT,OT orders. Cat stated "that because the home health orders were written while the patient was previous receiving home health the request will be denied. The orders written 10/28/2019 will need to be cancelled and rewritten once the patient has been reassessed by the physician.A request a different Home Health agency.  "

## 2019-12-04 ENCOUNTER — OFFICE VISIT (OUTPATIENT)
Dept: FAMILY MEDICINE | Facility: HOSPITAL | Age: 79
End: 2019-12-04
Attending: SPECIALIST
Payer: MEDICARE

## 2019-12-04 ENCOUNTER — TELEPHONE (OUTPATIENT)
Dept: FAMILY MEDICINE | Facility: HOSPITAL | Age: 79
End: 2019-12-04

## 2019-12-04 ENCOUNTER — HOSPITAL ENCOUNTER (OUTPATIENT)
Dept: RADIOLOGY | Facility: HOSPITAL | Age: 79
Discharge: HOME | End: 2019-12-04
Attending: STUDENT IN AN ORGANIZED HEALTH CARE EDUCATION/TRAINING PROGRAM
Payer: MEDICARE

## 2019-12-04 VITALS — SYSTOLIC BLOOD PRESSURE: 105 MMHG | HEART RATE: 80 BPM | DIASTOLIC BLOOD PRESSURE: 71 MMHG

## 2019-12-04 DIAGNOSIS — N30.01 ACUTE CYSTITIS WITH HEMATURIA: Primary | ICD-10-CM

## 2019-12-04 DIAGNOSIS — J10.1 INFLUENZA B: ICD-10-CM

## 2019-12-04 PROCEDURE — 71046 X-RAY EXAM CHEST 2 VIEWS: CPT | Mod: TC,FY

## 2019-12-04 PROCEDURE — 99213 OFFICE O/P EST LOW 20 MIN: CPT | Mod: 25 | Performed by: STUDENT IN AN ORGANIZED HEALTH CARE EDUCATION/TRAINING PROGRAM

## 2019-12-04 PROCEDURE — 90662 IIV NO PRSV INCREASED AG IM: CPT

## 2019-12-04 PROCEDURE — 71046 X-RAY EXAM CHEST 2 VIEWS: CPT | Mod: 26,,, | Performed by: RADIOLOGY

## 2019-12-04 PROCEDURE — 71046 XR CHEST PA AND LATERAL: ICD-10-PCS | Mod: 26,,, | Performed by: RADIOLOGY

## 2019-12-04 RX ORDER — OSELTAMIVIR PHOSPHATE 75 MG/1
75 CAPSULE ORAL DAILY
Qty: 5 CAPSULE | Refills: 0 | Status: SHIPPED | OUTPATIENT
Start: 2019-12-04 | End: 2019-12-05

## 2019-12-04 RX ORDER — CIPROFLOXACIN 500 MG/1
500 TABLET ORAL 2 TIMES DAILY
Qty: 14 TABLET | Refills: 0 | Status: SHIPPED | OUTPATIENT
Start: 2019-12-04 | End: 2019-12-05

## 2019-12-04 NOTE — PROGRESS NOTES
Subjective:       Patient ID: Hétcor Harvey is a 79 y.o. male.    Chief Complaint: Urinary Tract Infection    Patient is a 80 yo Male who presents today with complaint of possible UTI. Patients family reports that patient has been lethargic and leaning to one side persistently, and that these signs are consistent with past UTI's. Patient has had 4 previous UTI's this year. Patient wears adult diapers and is changed 4 times per day. Urine is reported to be a light brown color, which is also consistent with patients previous UTI's. Family also reports a productive cough with thigh white sputum for the last week. This cough is reported to be something that occurs around late fall each year.    Review of Systems   Constitutional: Positive for activity change and appetite change. Negative for fever.        Patient has been persistently leaning to one side since monday   HENT: Negative for congestion, sneezing, sore throat and trouble swallowing.    Eyes: Negative for pain and redness.   Respiratory: Positive for cough. Negative for shortness of breath and wheezing.    Genitourinary: Negative for decreased urine volume, hematuria and penile pain.        Reported change of urine color to light brown   Musculoskeletal: Negative.    Skin: Negative.    Neurological: Negative for syncope and headaches.   Hematological: Negative.    Psychiatric/Behavioral:        Dementia       Objective:      Vitals:    12/04/19 1007   BP: 105/71   Pulse: 80     Physical Exam   HENT:   Head: Atraumatic.   Eyes: Conjunctivae are normal.   Cardiovascular: Normal rate, regular rhythm and normal heart sounds.   Pulmonary/Chest: Effort normal.   Abdominal: Soft.   Neurological:   Limited ability to speak and limited awareness to situation. Patient would occasionally fall asleep and drool.   Skin: Skin is warm and dry.   Psychiatric:   Mood difficult to assess due to underlying dementia. Attempted to prevent use of thermometer    Nursing note and  vitals reviewed.      Assessment:     Patient is a 78 yo male presenting for potential UTI. He has past medical history of recurrent UTI's and Dementia. He also has a productive cough for one week. There is also potential for Influenza.  1. Acute cystitis with hematuria    2. Influenza B        Plan:       Acute cystitis with hematuria  -     Straight Cath  -     ciprofloxacin HCl (CIPRO) 500 MG tablet; Take 1 tablet (500 mg total) by mouth 2 (two) times daily. for 7 days  Dispense: 14 tablet; Refill: 0  -     Ambulatory referral to Urology  -     Ambulatory referral to Home Health  -     Urinalysis  -     Urine culture    Influenza B  -     Flu Vaccine - High Dose (PF) (65+)  -     Influenza B positive   -     oseltamivir (TAMIFLU) 75 MG capsule; Take 1 capsule (75 mg total) by mouth once daily. for 5 days  Dispense: 5 capsule; Refill: 0  -     X-Ray Chest PA And Lateral; Future; Expected date: 12/04/2019           Patient to follow up PRN.          Haresh Man MD        [employer] [facility], PGY-2

## 2019-12-04 NOTE — TELEPHONE ENCOUNTER
----- Message from Estrellita Nichols MA sent at 12/4/2019 12:36 PM CST -----  Contact: [facility]; [facility] [address]  [phone]  Pharmacy states that the tamiflu should have been prescribed twice daily if patient tested positive for the flu.  Please call pharmacy to verify directions.  Thanks.

## 2019-12-05 RX ORDER — CIPROFLOXACIN 500 MG/5ML
500 KIT ORAL 2 TIMES DAILY
Qty: 70 ML | Refills: 0 | Status: SHIPPED | OUTPATIENT
Start: 2019-12-05 | End: 2019-12-12

## 2019-12-05 RX ORDER — OSELTAMIVIR PHOSPHATE 6 MG/ML
75 FOR SUSPENSION ORAL 2 TIMES DAILY
Qty: 125 ML | Refills: 0 | Status: SHIPPED | OUTPATIENT
Start: 2019-12-05 | End: 2019-12-10

## 2019-12-09 ENCOUNTER — TELEPHONE (OUTPATIENT)
Dept: UROLOGY | Facility: CLINIC | Age: 79
End: 2019-12-09

## 2019-12-09 ENCOUNTER — OFFICE VISIT (OUTPATIENT)
Dept: UROLOGY | Facility: CLINIC | Age: 79
End: 2019-12-09
Payer: MEDICARE

## 2019-12-09 VITALS
BODY MASS INDEX: 14.16 KG/M2 | DIASTOLIC BLOOD PRESSURE: 64 MMHG | HEART RATE: 73 BPM | HEIGHT: 65 IN | SYSTOLIC BLOOD PRESSURE: 88 MMHG | WEIGHT: 85 LBS

## 2019-12-09 DIAGNOSIS — N39.0 RECURRENT UTI: ICD-10-CM

## 2019-12-09 PROCEDURE — 1126F AMNT PAIN NOTED NONE PRSNT: CPT | Mod: S$GLB,,, | Performed by: UROLOGY

## 2019-12-09 PROCEDURE — 1159F MED LIST DOCD IN RCRD: CPT | Mod: S$GLB,,, | Performed by: UROLOGY

## 2019-12-09 PROCEDURE — 1159F PR MEDICATION LIST DOCUMENTED IN MEDICAL RECORD: ICD-10-PCS | Mod: S$GLB,,, | Performed by: UROLOGY

## 2019-12-09 PROCEDURE — 99204 PR OFFICE/OUTPT VISIT, NEW, LEVL IV, 45-59 MIN: ICD-10-PCS | Mod: S$GLB,,, | Performed by: UROLOGY

## 2019-12-09 PROCEDURE — 99204 OFFICE O/P NEW MOD 45 MIN: CPT | Mod: S$GLB,,, | Performed by: UROLOGY

## 2019-12-09 PROCEDURE — 1101F PT FALLS ASSESS-DOCD LE1/YR: CPT | Mod: CPTII,S$GLB,, | Performed by: UROLOGY

## 2019-12-09 PROCEDURE — 1101F PR PT FALLS ASSESS DOC 0-1 FALLS W/OUT INJ PAST YR: ICD-10-PCS | Mod: CPTII,S$GLB,, | Performed by: UROLOGY

## 2019-12-09 PROCEDURE — 99999 PR PBB SHADOW E&M-EST. PATIENT-LVL IV: ICD-10-PCS | Mod: PBBFAC,,, | Performed by: UROLOGY

## 2019-12-09 PROCEDURE — 1126F PR PAIN SEVERITY QUANTIFIED, NO PAIN PRESENT: ICD-10-PCS | Mod: S$GLB,,, | Performed by: UROLOGY

## 2019-12-09 PROCEDURE — 99999 PR PBB SHADOW E&M-EST. PATIENT-LVL IV: CPT | Mod: PBBFAC,,, | Performed by: UROLOGY

## 2019-12-09 RX ORDER — TRIMETHOPRIM 100 MG/1
100 TABLET ORAL 2 TIMES DAILY
Qty: 90 TABLET | Refills: 1 | Status: SHIPPED | OUTPATIENT
Start: 2019-12-09 | End: 2020-03-08

## 2019-12-09 NOTE — PROGRESS NOTES
CC: recurrent UTI    Héctor Harvey is a 79 y.o. man who is here for the evaluation of Urinary Tract Infection    A new pt referred by his PCP, Crissy Linares MD   Hx of E. Coli UTI in May this year then recurrent UTI in October.  He has been treated with cipro for 5 our 7 days treatment at this time.  He is here with her daughter, who works for [email or web address], a friend of mine.  Pt has a hx of dementia. Typically when he gets UTI, he gets weaker and has a hard time keeping his head straight.  No fever or chills reported.  Pt is not able to communicate with me.  He voids to his diapers.  Recently when a ewing catheter was placed, no urine was collected because his bladder was apparently empty.    Past Medical History:   Diagnosis Date    Late onset Alzheimer's disease without behavioral disturbance 10/20/2016    Physical deconditioning 5/24/2019     Past Surgical History:   Procedure Laterality Date    COLONOSCOPY  2011     Social History     Tobacco Use    Smoking status: Never Smoker   Substance Use Topics    Alcohol use: No    Drug use: Never     No family history on file.  Allergy:  Review of patient's allergies indicates:  No Known Allergies  Outpatient Encounter Medications as of 12/9/2019   Medication Sig Dispense Refill    b complex vitamins tablet Take 1 tablet by mouth once daily.      cholecalciferol, vitamin D3, 1,000 unit capsule Take 1 capsule (1,000 Units total) by mouth once daily. 30 capsule 6    ciprofloxacin (CIPRO) 500 mg/5 mL suspension Take 5 mLs (500 mg total) by mouth 2 (two) times daily. for 7 days 70 mL 0    docusate sodium (COLACE) 100 MG capsule docusate sodium 100 mg capsule   TAKE 1 CAPSULE BY MOUTH TWICE A DAY      donepezil (ARICEPT) 10 MG tablet Take 1 tablet (10 mg total) by mouth once daily. 90 tablet 2    memantine (NAMENDA) 10 MG Tab Take 1 tablet (10 mg total) by mouth 2 (two) times daily. 180 tablet 3    oseltamivir (TAMIFLU) 6 mg/mL SusR Take 12.5 mLs (75 mg  total) by mouth 2 (two) times daily. for 5 days (Patient not taking: Reported on 12/9/2019) 125 mL 0    trimethoprim (TRIMPEX) 100 mg Tab Take 1 tablet (100 mg total) by mouth 2 (two) times daily. 90 tablet 1     No facility-administered encounter medications on file as of 12/9/2019.      Review of Systems   ROS  Physical Exam     Vitals:    12/09/19 1558   BP: (!) 88/64   Pulse: 73     Physical Exam   Constitutional: He is oriented to person, place, and time. He appears well-developed and well-nourished. No distress.   Sitting on a wheel chair comfortably but not able to keep his head straight.  Unable to communicate with me.   HENT:   Head: Normocephalic and atraumatic.   Right Ear: External ear normal.   Left Ear: External ear normal.   Nose: Nose normal.   Mouth/Throat: Oropharynx is clear and moist.   Eyes: Conjunctivae are normal. Pupils are equal, round, and reactive to light.   Neck: Normal range of motion. Neck supple. No JVD present. No tracheal deviation present. No thyromegaly present.   Cardiovascular: Normal rate, regular rhythm, normal heart sounds and intact distal pulses.  Exam reveals no gallop and no friction rub.    No murmur heard.  Pulmonary/Chest: Effort normal and breath sounds normal. No respiratory distress. He has no wheezes. He exhibits no tenderness.   Abdominal: Soft. Bowel sounds are normal. He exhibits no distension and no mass. There is no tenderness. There is no rebound and no guarding.   Genitourinary: No penile tenderness.   Genitourinary Comments: Prostate exam deferred.   Musculoskeletal: Normal range of motion. He exhibits no edema, tenderness or deformity.   Lymphadenopathy:     He has no cervical adenopathy.   Neurological: He is alert and oriented to person, place, and time.   Skin: Skin is warm and dry. He is not diaphoretic.     Psychiatric: He has a normal mood and affect. His behavior is normal. Thought content normal.               LABS:  No results found for: PSA,  PSADIAG, PSATOTAL, PSAFREE, PSAFREEPCT  No results found for this or any previous visit.  Lab Results   Component Value Date    CREATININE 0.6 10/11/2019    CREATININE 0.7 10/10/2019    CREATININE 0.7 10/09/2019     No results found for this or any previous visit.  Urine Culture, Routine   Date Value Ref Range Status   10/10/2019 No growth  Final     Hemoglobin A1C   Date Value Ref Range Status   05/24/2019 5.2 4.0 - 5.6 % Final     Comment:     ADA Screening Guidelines:  5.7-6.4%  Consistent with prediabetes  >or=6.5%  Consistent with diabetes  High levels of fetal hemoglobin interfere with the HbA1C  assay. Heterozygous hemoglobin variants (HbS, HgC, etc)do  not significantly interfere with this assay.   However, presence of multiple variants may affect accuracy.         Radiology:  No  evaluation done    Assessment and Plan:  Héctor was seen today for urinary tract infection.    Diagnoses and all orders for this visit:    Recurrent UTI  -     Urine culture  -     US Retroperitoneal Complete (Kidney and; Future  -     trimethoprim (TRIMPEX) 100 mg Tab; Take 1 tablet (100 mg total) by mouth 2 (two) times daily.    will start him on suppressive abx   Probiotics.  Unable to collect urine culture today.  Will evaluate him with US.  Hold off cysto for now.    Follow-up:  Follow up in about 3 months (around 3/9/2020), or bladder and kidney US.

## 2019-12-09 NOTE — LETTER
December 9, 2019      Haresh Man MD  [address]  [address]  [facility] [address]           [facility]  [address]  [place] [address]  Phone: [phone]          Patient: Héctor Harvey   MR Number: [id number]   YOB: 1940   Date of Visit: 12/9/2019       Dear Dr. Haresh Man:    Thank you for referring Héctor Harvey to me for evaluation. Attached you will find relevant portions of my assessment and plan of care.    If you have questions, please do not hesitate to call me. I look forward to following Héctor Harvey along with you.    Sincerely,    Ander Dee MD    Enclosure  CC:  No Recipients    If you would like to receive this communication electronically, please contact [email or web address] or [phone] to request more information on [employer] access.    For providers and/or their staff who would like to refer a patient to [facility], please contact us through our one-stop-shop provider referral line, [facility] , at [phone].    If you feel you have received this communication in error or would no longer like to receive these types of communications, please e-mail [email or web address]

## 2019-12-09 NOTE — TELEPHONE ENCOUNTER
We were unable to collect urine for culture. Pt unable to follow commands and currently wearing depends. Family was instructed to finish cipro and then to start trimethoprim per d.rwoo.

## 2019-12-13 ENCOUNTER — TELEPHONE (OUTPATIENT)
Dept: FAMILY MEDICINE | Facility: HOSPITAL | Age: 79
End: 2019-12-13

## 2019-12-13 NOTE — TELEPHONE ENCOUNTER
----- Message from Estrellita Nichols MA sent at 12/13/2019  8:58 AM CST -----  Contact: [facility]; Michelle  darius [phone] f [phone]  Michelle requests return call do discuss orders she received for aim program or hospice.  Thanks.

## 2019-12-16 ENCOUNTER — HOSPITAL ENCOUNTER (OUTPATIENT)
Dept: RADIOLOGY | Facility: HOSPITAL | Age: 79
Discharge: HOME | End: 2019-12-16
Attending: UROLOGY
Payer: MEDICARE

## 2019-12-16 DIAGNOSIS — N39.0 RECURRENT UTI: ICD-10-CM

## 2019-12-16 PROCEDURE — 76770 US EXAM ABDO BACK WALL COMP: CPT | Mod: 26,,, | Performed by: INTERNAL MEDICINE

## 2019-12-16 PROCEDURE — 76770 US RETROPERITONEAL COMPLETE: ICD-10-PCS | Mod: 26,,, | Performed by: INTERNAL MEDICINE

## 2019-12-16 PROCEDURE — 76770 US EXAM ABDO BACK WALL COMP: CPT | Mod: TC

## 2019-12-30 ENCOUNTER — EXTERNAL HOME HEALTH (OUTPATIENT)
Dept: HOME HEALTH SERVICES | Facility: HOSPITAL | Age: 79
End: 2019-12-30

## 2019-12-31 ENCOUNTER — EXTERNAL HOME HEALTH (OUTPATIENT)
Dept: HOME HEALTH SERVICES | Facility: HOSPITAL | Age: 79
End: 2019-12-31
Payer: MEDICARE

## 2020-01-03 ENCOUNTER — TELEPHONE (OUTPATIENT)
Dept: FAMILY MEDICINE | Facility: HOSPITAL | Age: 80
End: 2020-01-03

## 2020-01-03 NOTE — TELEPHONE ENCOUNTER
Office was notified via patient's daughter that patient was admitted to [facility] due to aspiration.
